# Patient Record
Sex: FEMALE | Race: WHITE | Employment: OTHER | URBAN - METROPOLITAN AREA
[De-identification: names, ages, dates, MRNs, and addresses within clinical notes are randomized per-mention and may not be internally consistent; named-entity substitution may affect disease eponyms.]

---

## 2019-09-01 ENCOUNTER — APPOINTMENT (OUTPATIENT)
Dept: CT IMAGING | Age: 64
End: 2019-09-01
Attending: EMERGENCY MEDICINE
Payer: COMMERCIAL

## 2019-09-01 ENCOUNTER — HOSPITAL ENCOUNTER (OUTPATIENT)
Age: 64
Setting detail: OBSERVATION
Discharge: HOME | End: 2019-09-04
Attending: EMERGENCY MEDICINE | Admitting: INTERNAL MEDICINE
Payer: COMMERCIAL

## 2019-09-01 ENCOUNTER — APPOINTMENT (OUTPATIENT)
Dept: GENERAL RADIOLOGY | Age: 64
End: 2019-09-01
Attending: EMERGENCY MEDICINE
Payer: COMMERCIAL

## 2019-09-01 DIAGNOSIS — R47.9 SPEECH DISTURBANCE, UNSPECIFIED TYPE: ICD-10-CM

## 2019-09-01 DIAGNOSIS — R20.2 NUMBNESS AND TINGLING OF BOTH LOWER EXTREMITIES: Primary | ICD-10-CM

## 2019-09-01 DIAGNOSIS — R00.2 PALPITATIONS: ICD-10-CM

## 2019-09-01 DIAGNOSIS — R20.0 NUMBNESS AND TINGLING OF BOTH LOWER EXTREMITIES: Primary | ICD-10-CM

## 2019-09-01 DIAGNOSIS — R53.1 WEAKNESS: ICD-10-CM

## 2019-09-01 PROBLEM — I63.9 ACUTE CVA (CEREBROVASCULAR ACCIDENT) (HCC): Status: ACTIVE | Noted: 2019-09-01

## 2019-09-01 PROBLEM — G45.9 TIA (TRANSIENT ISCHEMIC ATTACK): Status: ACTIVE | Noted: 2019-09-01

## 2019-09-01 LAB
ALBUMIN SERPL-MCNC: 3.6 G/DL (ref 3.5–5)
ALBUMIN/GLOB SERPL: 1 {RATIO} (ref 1.1–2.2)
ALP SERPL-CCNC: 87 U/L (ref 45–117)
ALT SERPL-CCNC: 28 U/L (ref 12–78)
ANION GAP SERPL CALC-SCNC: 12 MMOL/L (ref 5–15)
AST SERPL-CCNC: 18 U/L (ref 15–37)
ATRIAL RATE: 81 BPM
BASOPHILS # BLD: 0 K/UL (ref 0–0.1)
BASOPHILS NFR BLD: 0 % (ref 0–1)
BILIRUB SERPL-MCNC: 0.3 MG/DL (ref 0.2–1)
BNP SERPL-MCNC: 293 PG/ML (ref 0–125)
BUN SERPL-MCNC: 13 MG/DL (ref 6–20)
BUN/CREAT SERPL: 11 (ref 12–20)
CALCIUM SERPL-MCNC: 8.7 MG/DL (ref 8.5–10.1)
CALCULATED P AXIS, ECG09: 35 DEGREES
CALCULATED R AXIS, ECG10: 10 DEGREES
CALCULATED T AXIS, ECG11: 25 DEGREES
CHLORIDE SERPL-SCNC: 102 MMOL/L (ref 97–108)
CO2 SERPL-SCNC: 26 MMOL/L (ref 21–32)
CREAT SERPL-MCNC: 1.14 MG/DL (ref 0.55–1.02)
DIAGNOSIS, 93000: NORMAL
DIFFERENTIAL METHOD BLD: ABNORMAL
EOSINOPHIL # BLD: 0.3 K/UL (ref 0–0.4)
EOSINOPHIL NFR BLD: 3 % (ref 0–7)
ERYTHROCYTE [DISTWIDTH] IN BLOOD BY AUTOMATED COUNT: 14.1 % (ref 11.5–14.5)
GLOBULIN SER CALC-MCNC: 3.5 G/DL (ref 2–4)
GLUCOSE BLD STRIP.AUTO-MCNC: 110 MG/DL (ref 65–100)
GLUCOSE BLD STRIP.AUTO-MCNC: 124 MG/DL (ref 65–100)
GLUCOSE BLD STRIP.AUTO-MCNC: 95 MG/DL (ref 65–100)
GLUCOSE SERPL-MCNC: 117 MG/DL (ref 65–100)
HCT VFR BLD AUTO: 37.8 % (ref 35–47)
HGB BLD-MCNC: 12.3 G/DL (ref 11.5–16)
IMM GRANULOCYTES # BLD AUTO: 0 K/UL (ref 0–0.04)
IMM GRANULOCYTES NFR BLD AUTO: 1 % (ref 0–0.5)
INR PPP: 0.9 (ref 0.9–1.1)
LYMPHOCYTES # BLD: 1.8 K/UL (ref 0.8–3.5)
LYMPHOCYTES NFR BLD: 23 % (ref 12–49)
MAGNESIUM SERPL-MCNC: 1.8 MG/DL (ref 1.6–2.4)
MCH RBC QN AUTO: 28.3 PG (ref 26–34)
MCHC RBC AUTO-ENTMCNC: 32.5 G/DL (ref 30–36.5)
MCV RBC AUTO: 87.1 FL (ref 80–99)
MONOCYTES # BLD: 0.6 K/UL (ref 0–1)
MONOCYTES NFR BLD: 8 % (ref 5–13)
NEUTS SEG # BLD: 5.4 K/UL (ref 1.8–8)
NEUTS SEG NFR BLD: 65 % (ref 32–75)
NRBC # BLD: 0 K/UL (ref 0–0.01)
NRBC BLD-RTO: 0 PER 100 WBC
P-R INTERVAL, ECG05: 160 MS
PLATELET # BLD AUTO: 245 K/UL (ref 150–400)
PMV BLD AUTO: 8.7 FL (ref 8.9–12.9)
POTASSIUM SERPL-SCNC: 3.8 MMOL/L (ref 3.5–5.1)
PROT SERPL-MCNC: 7.1 G/DL (ref 6.4–8.2)
PROTHROMBIN TIME: 9.2 SEC (ref 9–11.1)
Q-T INTERVAL, ECG07: 404 MS
QRS DURATION, ECG06: 84 MS
QTC CALCULATION (BEZET), ECG08: 469 MS
RBC # BLD AUTO: 4.34 M/UL (ref 3.8–5.2)
SERVICE CMNT-IMP: ABNORMAL
SERVICE CMNT-IMP: ABNORMAL
SERVICE CMNT-IMP: NORMAL
SODIUM SERPL-SCNC: 140 MMOL/L (ref 136–145)
TROPONIN I SERPL-MCNC: <0.05 NG/ML
VENTRICULAR RATE, ECG03: 81 BPM
WBC # BLD AUTO: 8.1 K/UL (ref 3.6–11)

## 2019-09-01 PROCEDURE — 70496 CT ANGIOGRAPHY HEAD: CPT

## 2019-09-01 PROCEDURE — 70450 CT HEAD/BRAIN W/O DYE: CPT

## 2019-09-01 PROCEDURE — 99285 EMERGENCY DEPT VISIT HI MDM: CPT

## 2019-09-01 PROCEDURE — 84484 ASSAY OF TROPONIN QUANT: CPT

## 2019-09-01 PROCEDURE — 36415 COLL VENOUS BLD VENIPUNCTURE: CPT

## 2019-09-01 PROCEDURE — 71045 X-RAY EXAM CHEST 1 VIEW: CPT

## 2019-09-01 PROCEDURE — 99218 HC RM OBSERVATION: CPT

## 2019-09-01 PROCEDURE — 85610 PROTHROMBIN TIME: CPT

## 2019-09-01 PROCEDURE — 74011250636 HC RX REV CODE- 250/636: Performed by: INTERNAL MEDICINE

## 2019-09-01 PROCEDURE — 82962 GLUCOSE BLOOD TEST: CPT

## 2019-09-01 PROCEDURE — 83735 ASSAY OF MAGNESIUM: CPT

## 2019-09-01 PROCEDURE — 80053 COMPREHEN METABOLIC PANEL: CPT

## 2019-09-01 PROCEDURE — 74011250636 HC RX REV CODE- 250/636: Performed by: EMERGENCY MEDICINE

## 2019-09-01 PROCEDURE — 85025 COMPLETE CBC W/AUTO DIFF WBC: CPT

## 2019-09-01 PROCEDURE — 96372 THER/PROPH/DIAG INJ SC/IM: CPT

## 2019-09-01 PROCEDURE — 83880 ASSAY OF NATRIURETIC PEPTIDE: CPT

## 2019-09-01 PROCEDURE — 74011636320 HC RX REV CODE- 636/320: Performed by: EMERGENCY MEDICINE

## 2019-09-01 PROCEDURE — 74011250637 HC RX REV CODE- 250/637: Performed by: INTERNAL MEDICINE

## 2019-09-01 PROCEDURE — 93005 ELECTROCARDIOGRAM TRACING: CPT

## 2019-09-01 RX ORDER — SODIUM CHLORIDE 0.9 % (FLUSH) 0.9 %
5-40 SYRINGE (ML) INJECTION EVERY 8 HOURS
Status: DISCONTINUED | OUTPATIENT
Start: 2019-09-01 | End: 2019-09-04 | Stop reason: HOSPADM

## 2019-09-01 RX ORDER — HEPARIN SODIUM 5000 [USP'U]/ML
5000 INJECTION, SOLUTION INTRAVENOUS; SUBCUTANEOUS EVERY 8 HOURS
Status: DISCONTINUED | OUTPATIENT
Start: 2019-09-01 | End: 2019-09-04 | Stop reason: HOSPADM

## 2019-09-01 RX ORDER — SODIUM CHLORIDE 9 MG/ML
50 INJECTION, SOLUTION INTRAVENOUS
Status: COMPLETED | OUTPATIENT
Start: 2019-09-01 | End: 2019-09-01

## 2019-09-01 RX ORDER — ONDANSETRON 2 MG/ML
4 INJECTION INTRAMUSCULAR; INTRAVENOUS
Status: DISCONTINUED | OUTPATIENT
Start: 2019-09-01 | End: 2019-09-04 | Stop reason: HOSPADM

## 2019-09-01 RX ORDER — SODIUM CHLORIDE 0.9 % (FLUSH) 0.9 %
5-40 SYRINGE (ML) INJECTION AS NEEDED
Status: DISCONTINUED | OUTPATIENT
Start: 2019-09-01 | End: 2019-09-04 | Stop reason: HOSPADM

## 2019-09-01 RX ORDER — DIPHENHYDRAMINE HCL 25 MG
25 CAPSULE ORAL
Status: DISCONTINUED | OUTPATIENT
Start: 2019-09-01 | End: 2019-09-04 | Stop reason: HOSPADM

## 2019-09-01 RX ORDER — INSULIN LISPRO 100 [IU]/ML
INJECTION, SOLUTION INTRAVENOUS; SUBCUTANEOUS
Status: DISCONTINUED | OUTPATIENT
Start: 2019-09-01 | End: 2019-09-04 | Stop reason: HOSPADM

## 2019-09-01 RX ORDER — BISACODYL 5 MG
5 TABLET, DELAYED RELEASE (ENTERIC COATED) ORAL DAILY PRN
Status: DISCONTINUED | OUTPATIENT
Start: 2019-09-01 | End: 2019-09-04 | Stop reason: HOSPADM

## 2019-09-01 RX ORDER — ACETAMINOPHEN 650 MG/1
650 SUPPOSITORY RECTAL
Status: DISCONTINUED | OUTPATIENT
Start: 2019-09-01 | End: 2019-09-04 | Stop reason: HOSPADM

## 2019-09-01 RX ORDER — DEXTROSE MONOHYDRATE 100 MG/ML
125-250 INJECTION, SOLUTION INTRAVENOUS AS NEEDED
Status: DISCONTINUED | OUTPATIENT
Start: 2019-09-01 | End: 2019-09-04 | Stop reason: HOSPADM

## 2019-09-01 RX ORDER — ACETAMINOPHEN 325 MG/1
650 TABLET ORAL
Status: DISCONTINUED | OUTPATIENT
Start: 2019-09-01 | End: 2019-09-01

## 2019-09-01 RX ORDER — FLUOXETINE HYDROCHLORIDE 20 MG/1
20 CAPSULE ORAL DAILY
Status: DISCONTINUED | OUTPATIENT
Start: 2019-09-02 | End: 2019-09-04 | Stop reason: HOSPADM

## 2019-09-01 RX ORDER — ATORVASTATIN CALCIUM 20 MG/1
40 TABLET, FILM COATED ORAL
Status: DISCONTINUED | OUTPATIENT
Start: 2019-09-01 | End: 2019-09-04 | Stop reason: HOSPADM

## 2019-09-01 RX ORDER — FAMOTIDINE 20 MG/1
20 TABLET, FILM COATED ORAL EVERY 12 HOURS
Status: DISCONTINUED | OUTPATIENT
Start: 2019-09-01 | End: 2019-09-04 | Stop reason: HOSPADM

## 2019-09-01 RX ORDER — SODIUM CHLORIDE 0.9 % (FLUSH) 0.9 %
10 SYRINGE (ML) INJECTION
Status: COMPLETED | OUTPATIENT
Start: 2019-09-01 | End: 2019-09-01

## 2019-09-01 RX ORDER — ATENOLOL 50 MG/1
100 TABLET ORAL DAILY
Status: DISCONTINUED | OUTPATIENT
Start: 2019-09-02 | End: 2019-09-04 | Stop reason: HOSPADM

## 2019-09-01 RX ORDER — ACETAMINOPHEN 325 MG/1
650 TABLET ORAL
Status: DISCONTINUED | OUTPATIENT
Start: 2019-09-01 | End: 2019-09-04 | Stop reason: HOSPADM

## 2019-09-01 RX ORDER — MAGNESIUM SULFATE 100 %
4 CRYSTALS MISCELLANEOUS AS NEEDED
Status: DISCONTINUED | OUTPATIENT
Start: 2019-09-01 | End: 2019-09-04 | Stop reason: HOSPADM

## 2019-09-01 RX ORDER — HYDROCODONE BITARTRATE AND ACETAMINOPHEN 7.5; 325 MG/15ML; MG/15ML
10 SOLUTION ORAL
Status: DISCONTINUED | OUTPATIENT
Start: 2019-09-01 | End: 2019-09-01

## 2019-09-01 RX ADMIN — IOPAMIDOL 100 ML: 755 INJECTION, SOLUTION INTRAVENOUS at 15:25

## 2019-09-01 RX ADMIN — HEPARIN SODIUM 5000 UNITS: 5000 INJECTION INTRAVENOUS; SUBCUTANEOUS at 23:08

## 2019-09-01 RX ADMIN — Medication 10 ML: at 23:04

## 2019-09-01 RX ADMIN — FAMOTIDINE 20 MG: 20 TABLET ORAL at 23:10

## 2019-09-01 RX ADMIN — Medication 10 ML: at 15:25

## 2019-09-01 RX ADMIN — SODIUM CHLORIDE 50 ML/HR: 900 INJECTION, SOLUTION INTRAVENOUS at 15:25

## 2019-09-01 RX ADMIN — ATORVASTATIN CALCIUM 40 MG: 20 TABLET, FILM COATED ORAL at 23:04

## 2019-09-01 NOTE — ED PROVIDER NOTES
77-year-old morbidly obese white female past medical history breast cancer, diabetes, GERD, and hypertension, presents complaining of \"trying to walk down the [place] and suddenly got weak fell into the wall on the left side and my heart was beating funny\". Denies loss of consciousness, other associated symptoms, other than \"both my legs and both my arms felt like they were numb and weak\". Patient states this is the fifth episode of this type of occurrence of constellation of symptoms this month but the last one appearing previous this week on Wednesday or Thursday.     pt denies HA, vison changes, current numbness/ tingling,  diff swallowing, CP, SOB, Abd pain, F/Ch, N/V, D/Cons or other current systemic complaints           Past Medical History:   Diagnosis Date    Breast cancer ([facility] 1/11/2011    Cancer ([facility] [address]     BREAST,left    Diabetes ([facility] [address]     GERD (gastroesophageal reflux disease)     Hypertension        Past Surgical History:   Procedure Laterality Date    BREAST SURGERY PROCEDURE UNLISTED  1989    L BREAST LUMPECTOMY    BREAST SURGERY PROCEDURE UNLISTED  1990    BENIGN TUMOR L     HX GYN  2001    partial hysterectomy    HX HEENT  1965    TONSILLECTOMY    HX HEENT  2008    ARI CATARACTS    HX MASTECTOMY      bilateral 2011    HX ORTHOPAEDIC  2004    ARTHROSCOPY R KNEE    HX OTHER SURGICAL  1993    BENIGN TUMOR NECK    HX OTHER SURGICAL  2004    R UNDER ARM-TUMOR         Family History:   Problem Relation Age of Onset    Other Father        Social History     Socioeconomic History    Marital status: SINGLE     Spouse name: Not on file    Number of children: Not on file    Years of education: Not on file    Highest education level: Not on file   Occupational History    Not on file   Social Needs    Financial resource strain: Not on file    Food insecurity:     Worry: Not on file     Inability: Not on file    Transportation needs:     Medical: Not on file     Non-medical: Not on file Tobacco Use    Smoking status: Former Smoker   Substance and Sexual Activity    Alcohol use: Yes     Comment: OCCASIONAL    Drug use: Not on file    Sexual activity: Not on file   Lifestyle    Physical activity:     Days per week: Not on file     Minutes per session: Not on file    Stress: Not on file   Relationships    Social connections:     Talks on phone: Not on file     Gets together: Not on file     Attends [employer] service: Not on file     Active member of club or organization: Not on file     Attends meetings of clubs or organizations: Not on file     Relationship status: Not on file    Intimate partner violence:     Fear of current or ex partner: Not on file     Emotionally abused: Not on file     Physically abused: Not on file     Forced sexual activity: Not on file   Other Topics Concern    Not on file   Social History Narrative    Not on file         ALLERGIES: Ibuprofen; Maxzide [triamterene-hydrochlorothiazid]; and Vasotec [enalapril maleate]    Review of Systems   Constitutional: Negative for chills and fever. HENT: Negative for trouble swallowing and voice change. Eyes: Negative for visual disturbance. Respiratory: Negative for cough, chest tightness and stridor. Cardiovascular: Positive for palpitations. Negative for chest pain and leg swelling. Gastrointestinal: Negative for abdominal pain, diarrhea, nausea and vomiting. Genitourinary: Negative for dysuria. Musculoskeletal: Negative for back pain. Skin: Negative for rash. Neurological: Positive for speech difficulty, weakness and numbness. Negative for facial asymmetry. All other systems reviewed and are negative. There were no vitals filed for this visit. Physical Exam   Constitutional: She is oriented to person, place, and time. She appears well-developed and well-nourished.   m obese, NAD, AxOx4, speaking in complete sentences  gcs = 15   HENT:   Head: Normocephalic and atraumatic.    Mouth/Throat: Oropharynx is clear and moist.   Cn intact    No facial droop/ slurred speech/ tongue deviation   Eyes: Pupils are equal, round, and reactive to light. Conjunctivae and EOM are normal. Right eye exhibits no discharge. Left eye exhibits no discharge. No scleral icterus. Neck: Normal range of motion. Neck supple. No JVD present. No tracheal deviation present. Cardiovascular: Normal rate, regular rhythm, normal heart sounds and intact distal pulses. Exam reveals no gallop and no friction rub. No murmur heard. Pulmonary/Chest: Effort normal and breath sounds normal. No stridor. No respiratory distress. She has no wheezes. She has no rales. She exhibits no tenderness. Abdominal: Soft. Bowel sounds are normal. There is no tenderness. There is no rebound and no guarding. nttp     Genitourinary: No vaginal discharge found. Genitourinary Comments: Pt denies urinary/ vaginal complaints   Musculoskeletal: Normal range of motion. She exhibits no edema, tenderness or deformity. Neurological: She is alert and oriented to person, place, and time. No cranial nerve deficit. She exhibits normal muscle tone. Coordination normal.   pt has motor/ CV/ Sensation grossly intact to all extremities, R = L in strength;   Skin: Skin is warm and dry. Capillary refill takes less than 2 seconds. No rash noted. No erythema. No pallor. Psychiatric: She has a normal mood and affect. Her behavior is normal. Thought content normal.   Nursing note and vitals reviewed. MDM       Procedures    No chief complaint on file. 1:25 PM  The patients presenting problems have been discussed, and they are in agreement with the care plan formulated and outlined with them. I have encouraged them to ask questions as they arise throughout their visit.     MEDICATIONS GIVEN:  Medications - No data to display    LABS REVIEWED:  Labs Reviewed   SAMPLES BEING HELD       RADIOLOGY RESULTS:  The following have been ordered and reviewed:  _____________________________________________________________________  _____________________________________________________________________    EKG interpretation:   Rhythm: normal sinus rhythm; and regular . Rate (approx.): 80; Axis: normal; P wave: normal; QRS interval: normal ; ST/T wave: normal; Negative acute significant segmental elevations/ unchanged compared to study dated 01/05/2011    PROCEDURES:        CONSULTATIONS:       PROGRESS NOTES:      DIAGNOSIS:    1. Numbness and tingling of both lower extremities    2. Weakness    3. Speech disturbance, unspecified type    4. Palpitations        PLAN:  1- admit/ r/o TIA;       ED COURSE: The patients hospital course has been uncomplicated. CONSULT  NOTE  1:47 PM  Neva Oliveros MD spoke with Dr Hal Schwarz. Specialty: Teleneurology  Discussed pt's hx, disposition, and available diagnostic and imaging results. Reviewed care plans. Consulting physician agrees with plans as outlined 'I will see her'; [facility] CONSULT  NOTE  2:14 PM  Neva Oliveros MD spoke with Dr Hal Schwarz. Specialty: Teleneurology  Discussed pt's hx, disposition, and available diagnostic and imaging results. Reviewed care plans. Consulting physician agrees with plans as outlined 'No TPA/ CTA/ MRI/ Admit. Pt agrees w/ plans; Hospitalist Skyla for Admission  2:35 PM    ED Room Number: SER03/03  Patient Name and age: Verenice Haines 59 y.o.  female  Working Diagnosis:   1. Numbness and tingling of both lower extremities    2. Weakness    3. Speech disturbance, unspecified type    4.  Palpitations      Readmission: no  Isolation Requirements:  no  Recommended Level of Care:  telemetry  Code Status:  Full Code  Department:[place] [facility] - [phone]  Other:  No TPA

## 2019-09-01 NOTE — PROGRESS NOTES
Bedside and Verbal shift change report given to Brittani Briscoe RN (oncoming nurse) by Tammy Wadsworth RN and MALIK Carbone (offgoing nurse). Report included the following information SBAR, Kardex, MAR and Cardiac Rhythm NSR.

## 2019-09-01 NOTE — ED NOTES
Patient arriving with c/o slurred speech, dizziness and shortness of breath starting around 12:45 after Sabianism. Friend reports that she lost her balance and fell into a wall, reports that he speech continues to sound slurred to her. Code S Level 1 called, patient to CT.

## 2019-09-01 NOTE — ED NOTES
[facility] here to transfer pt to [facility]. Report given to Medic and RN from [facility]. Pt left on stretcher in stable condition.

## 2019-09-01 NOTE — PROGRESS NOTES
Problem: Pain  Goal: *Control of Pain  Outcome: Progressing Towards Goal     Problem: Pressure Injury - Risk of  Goal: *Prevention of pressure injury  Description  Document Nadir Scale and appropriate interventions in the flowsheet. Outcome: Progressing Towards Goal  Note:   Pressure Injury Interventions: Activity Interventions: Assess need for specialty bed, Increase time out of bed, PT/OT evaluation, Pressure redistribution bed/mattress(bed type)         Nutrition Interventions: Document food/fluid/supplement intake                     Problem: Falls - Risk of  Goal: *Absence of Falls  Description  Document Brandenin Filimauro Fall Risk and appropriate interventions in the flowsheet.   Outcome: Progressing Towards Goal  Note:   Fall Risk Interventions:  Mobility Interventions: Bed/chair exit alarm, Patient to call before getting OOB, PT Consult for mobility concerns, Utilize walker, cane, or other assistive device                   History of Falls Interventions: Bed/chair exit alarm, Door open when patient unattended, Evaluate medications/consider consulting pharmacy, Investigate reason for fall         Problem: TIA/CVA Stroke: 0-24 hours  Goal: Activity/Safety  Outcome: Progressing Towards Goal  Goal: Nutrition/Diet  Outcome: Progressing Towards Goal

## 2019-09-01 NOTE — ED NOTES
Report called to [facility] on [facility]. Pt has returned from CTA.   Will continue to monitor

## 2019-09-01 NOTE — ED TRIAGE NOTES
Pt comes in today presents with slurred speech and \"leaning to one side\". Pt was taken straight to CT with BS completed on CT Table on 124, MD assessed pt outside of CT while table was being cleared. Pt states last time felt well was 1145 last time she felt \"normal\". Pt was scanned, code S called, pt weighed on scale, IV established, VSS, pt. Pt A7O x4, denies pain, denied numbness until after laying down on CT table now c/o numbness around lips, all the way around, not one side more than the other.

## 2019-09-02 LAB
ALBUMIN SERPL-MCNC: 3.4 G/DL (ref 3.5–5)
ALBUMIN/GLOB SERPL: 1 {RATIO} (ref 1.1–2.2)
ALP SERPL-CCNC: 87 U/L (ref 45–117)
ALT SERPL-CCNC: 22 U/L (ref 12–78)
ANION GAP SERPL CALC-SCNC: 6 MMOL/L (ref 5–15)
APPEARANCE UR: CLEAR
AST SERPL-CCNC: 28 U/L (ref 15–37)
BACTERIA URNS QL MICRO: NEGATIVE /HPF
BASOPHILS # BLD: 0 K/UL (ref 0–0.1)
BASOPHILS NFR BLD: 0 % (ref 0–1)
BILIRUB SERPL-MCNC: 0.5 MG/DL (ref 0.2–1)
BILIRUB UR QL: NEGATIVE
BUN SERPL-MCNC: 10 MG/DL (ref 6–20)
BUN/CREAT SERPL: 9 (ref 12–20)
CALCIUM SERPL-MCNC: 9.2 MG/DL (ref 8.5–10.1)
CHLORIDE SERPL-SCNC: 106 MMOL/L (ref 97–108)
CHOLEST SERPL-MCNC: 145 MG/DL
CO2 SERPL-SCNC: 26 MMOL/L (ref 21–32)
COLOR UR: ABNORMAL
CREAT SERPL-MCNC: 1.06 MG/DL (ref 0.55–1.02)
CRP SERPL-MCNC: 0.36 MG/DL (ref 0–0.6)
D DIMER PPP FEU-MCNC: 0.41 MG/L FEU (ref 0–0.65)
DIFFERENTIAL METHOD BLD: ABNORMAL
EOSINOPHIL # BLD: 0.2 K/UL (ref 0–0.4)
EOSINOPHIL NFR BLD: 3 % (ref 0–7)
EPITH CASTS URNS QL MICRO: ABNORMAL /LPF
ERYTHROCYTE [DISTWIDTH] IN BLOOD BY AUTOMATED COUNT: 14 % (ref 11.5–14.5)
ERYTHROCYTE [SEDIMENTATION RATE] IN BLOOD: 37 MM/HR (ref 0–30)
EST. AVERAGE GLUCOSE BLD GHB EST-MCNC: 128 MG/DL
GLOBULIN SER CALC-MCNC: 3.5 G/DL (ref 2–4)
GLUCOSE BLD STRIP.AUTO-MCNC: 119 MG/DL (ref 65–100)
GLUCOSE BLD STRIP.AUTO-MCNC: 158 MG/DL (ref 65–100)
GLUCOSE BLD STRIP.AUTO-MCNC: 93 MG/DL (ref 65–100)
GLUCOSE BLD STRIP.AUTO-MCNC: 99 MG/DL (ref 65–100)
GLUCOSE SERPL-MCNC: 98 MG/DL (ref 65–100)
GLUCOSE UR STRIP.AUTO-MCNC: NEGATIVE MG/DL
HBA1C MFR BLD: 6.1 % (ref 4.2–6.3)
HCT VFR BLD AUTO: 37.9 % (ref 35–47)
HDLC SERPL-MCNC: 41 MG/DL
HDLC SERPL: 3.5 {RATIO} (ref 0–5)
HGB BLD-MCNC: 12 G/DL (ref 11.5–16)
HGB UR QL STRIP: NEGATIVE
HYALINE CASTS URNS QL MICRO: ABNORMAL /LPF (ref 0–5)
IMM GRANULOCYTES # BLD AUTO: 0 K/UL (ref 0–0.04)
IMM GRANULOCYTES NFR BLD AUTO: 0 % (ref 0–0.5)
KETONES UR QL STRIP.AUTO: NEGATIVE MG/DL
LDLC SERPL CALC-MCNC: 49 MG/DL (ref 0–100)
LEUKOCYTE ESTERASE UR QL STRIP.AUTO: ABNORMAL
LIPID PROFILE,FLP: ABNORMAL
LYMPHOCYTES # BLD: 1.6 K/UL (ref 0.8–3.5)
LYMPHOCYTES NFR BLD: 22 % (ref 12–49)
MAGNESIUM SERPL-MCNC: 2.1 MG/DL (ref 1.6–2.4)
MCH RBC QN AUTO: 28.6 PG (ref 26–34)
MCHC RBC AUTO-ENTMCNC: 31.7 G/DL (ref 30–36.5)
MCV RBC AUTO: 90.5 FL (ref 80–99)
MONOCYTES # BLD: 0.6 K/UL (ref 0–1)
MONOCYTES NFR BLD: 8 % (ref 5–13)
NEUTS SEG # BLD: 4.7 K/UL (ref 1.8–8)
NEUTS SEG NFR BLD: 67 % (ref 32–75)
NITRITE UR QL STRIP.AUTO: NEGATIVE
NRBC # BLD: 0 K/UL (ref 0–0.01)
NRBC BLD-RTO: 0 PER 100 WBC
PH UR STRIP: 5.5 [PH] (ref 5–8)
PHOSPHATE SERPL-MCNC: 4.9 MG/DL (ref 2.6–4.7)
PLATELET # BLD AUTO: 217 K/UL (ref 150–400)
PMV BLD AUTO: 8.7 FL (ref 8.9–12.9)
POTASSIUM SERPL-SCNC: 4.5 MMOL/L (ref 3.5–5.1)
PROT SERPL-MCNC: 6.9 G/DL (ref 6.4–8.2)
PROT UR STRIP-MCNC: NEGATIVE MG/DL
RBC # BLD AUTO: 4.19 M/UL (ref 3.8–5.2)
RBC #/AREA URNS HPF: ABNORMAL /HPF (ref 0–5)
SERVICE CMNT-IMP: ABNORMAL
SERVICE CMNT-IMP: ABNORMAL
SERVICE CMNT-IMP: NORMAL
SERVICE CMNT-IMP: NORMAL
SODIUM SERPL-SCNC: 138 MMOL/L (ref 136–145)
SP GR UR REFRACTOMETRY: 1.02 (ref 1–1.03)
TRIGL SERPL-MCNC: 275 MG/DL (ref ?–150)
TROPONIN I SERPL-MCNC: <0.05 NG/ML
TROPONIN I SERPL-MCNC: <0.05 NG/ML
TSH SERPL DL<=0.05 MIU/L-ACNC: 1.35 UIU/ML (ref 0.36–3.74)
UA: UC IF INDICATED,UAUC: ABNORMAL
UROBILINOGEN UR QL STRIP.AUTO: 0.2 EU/DL (ref 0.2–1)
VIT B12 SERPL-MCNC: 690 PG/ML (ref 193–986)
VLDLC SERPL CALC-MCNC: 55 MG/DL
WBC # BLD AUTO: 7.2 K/UL (ref 3.6–11)
WBC URNS QL MICRO: ABNORMAL /HPF (ref 0–4)

## 2019-09-02 PROCEDURE — 85025 COMPLETE CBC W/AUTO DIFF WBC: CPT

## 2019-09-02 PROCEDURE — 86140 C-REACTIVE PROTEIN: CPT

## 2019-09-02 PROCEDURE — 74011250636 HC RX REV CODE- 250/636: Performed by: INTERNAL MEDICINE

## 2019-09-02 PROCEDURE — 74011250637 HC RX REV CODE- 250/637: Performed by: INTERNAL MEDICINE

## 2019-09-02 PROCEDURE — 81001 URINALYSIS AUTO W/SCOPE: CPT

## 2019-09-02 PROCEDURE — 84100 ASSAY OF PHOSPHORUS: CPT

## 2019-09-02 PROCEDURE — 80053 COMPREHEN METABOLIC PANEL: CPT

## 2019-09-02 PROCEDURE — 36415 COLL VENOUS BLD VENIPUNCTURE: CPT

## 2019-09-02 PROCEDURE — 97161 PT EVAL LOW COMPLEX 20 MIN: CPT

## 2019-09-02 PROCEDURE — 83735 ASSAY OF MAGNESIUM: CPT

## 2019-09-02 PROCEDURE — 99218 HC RM OBSERVATION: CPT

## 2019-09-02 PROCEDURE — 85652 RBC SED RATE AUTOMATED: CPT

## 2019-09-02 PROCEDURE — 74011636637 HC RX REV CODE- 636/637: Performed by: INTERNAL MEDICINE

## 2019-09-02 PROCEDURE — 96372 THER/PROPH/DIAG INJ SC/IM: CPT

## 2019-09-02 PROCEDURE — 83036 HEMOGLOBIN GLYCOSYLATED A1C: CPT

## 2019-09-02 PROCEDURE — 82607 VITAMIN B-12: CPT

## 2019-09-02 PROCEDURE — 85379 FIBRIN DEGRADATION QUANT: CPT

## 2019-09-02 PROCEDURE — 97535 SELF CARE MNGMENT TRAINING: CPT

## 2019-09-02 PROCEDURE — 82962 GLUCOSE BLOOD TEST: CPT

## 2019-09-02 PROCEDURE — 80061 LIPID PANEL: CPT

## 2019-09-02 PROCEDURE — 97165 OT EVAL LOW COMPLEX 30 MIN: CPT

## 2019-09-02 PROCEDURE — 84443 ASSAY THYROID STIM HORMONE: CPT

## 2019-09-02 PROCEDURE — 84484 ASSAY OF TROPONIN QUANT: CPT

## 2019-09-02 PROCEDURE — 87086 URINE CULTURE/COLONY COUNT: CPT

## 2019-09-02 RX ORDER — LOPERAMIDE HYDROCHLORIDE 2 MG/1
2 CAPSULE ORAL
Status: DISCONTINUED | OUTPATIENT
Start: 2019-09-02 | End: 2019-09-04 | Stop reason: HOSPADM

## 2019-09-02 RX ORDER — GUAIFENESIN 100 MG/5ML
81 LIQUID (ML) ORAL DAILY
Status: DISCONTINUED | OUTPATIENT
Start: 2019-09-03 | End: 2019-09-04 | Stop reason: HOSPADM

## 2019-09-02 RX ADMIN — Medication 10 ML: at 15:06

## 2019-09-02 RX ADMIN — FLUOXETINE 20 MG: 20 CAPSULE ORAL at 08:13

## 2019-09-02 RX ADMIN — FAMOTIDINE 20 MG: 20 TABLET ORAL at 21:43

## 2019-09-02 RX ADMIN — HEPARIN SODIUM 5000 UNITS: 5000 INJECTION INTRAVENOUS; SUBCUTANEOUS at 06:56

## 2019-09-02 RX ADMIN — LOPERAMIDE HYDROCHLORIDE 2 MG: 2 CAPSULE ORAL at 15:04

## 2019-09-02 RX ADMIN — INSULIN LISPRO 2 UNITS: 100 INJECTION, SOLUTION INTRAVENOUS; SUBCUTANEOUS at 12:39

## 2019-09-02 RX ADMIN — Medication 10 ML: at 06:57

## 2019-09-02 RX ADMIN — Medication 10 ML: at 21:43

## 2019-09-02 RX ADMIN — Medication 10 ML: at 15:05

## 2019-09-02 RX ADMIN — ATENOLOL 100 MG: 50 TABLET ORAL at 08:13

## 2019-09-02 RX ADMIN — ATORVASTATIN CALCIUM 40 MG: 20 TABLET, FILM COATED ORAL at 21:43

## 2019-09-02 RX ADMIN — FAMOTIDINE 20 MG: 20 TABLET ORAL at 08:13

## 2019-09-02 RX ADMIN — HEPARIN SODIUM 5000 UNITS: 5000 INJECTION INTRAVENOUS; SUBCUTANEOUS at 22:41

## 2019-09-02 RX ADMIN — HEPARIN SODIUM 5000 UNITS: 5000 INJECTION INTRAVENOUS; SUBCUTANEOUS at 15:05

## 2019-09-02 NOTE — PROGRESS NOTES
PHYSICAL THERAPY EVALUATION/DISCHARGE  Patient: Lynn French (22 y.o. female)  Date: 9/2/2019  Primary Diagnosis: TIA (transient ischemic attack) [G45.9]       Precautions: fall         ASSESSMENT  Based on the objective data described below, the patient presents with r/o CVA with dizziness and unsteadiness. Pt lives alone but will likely DC home with family member for a few days. Pt ambulating well without loss of balance and baseline mobility per pt. Pt walks 250ft with cane and requires stand by assist for safety. Recommend pt DC home with supervision from family members and assist to walk in the hallway here from RN staff. Functional Outcome Measure: The patient scored 43/56 on the kirk outcome measure which is indicative of low fall risk. Other factors to consider for discharge:      Further skilled acute physical therapy is not indicated at this time. PLAN :  Recommendation for discharge: (in order for the patient to meet his/her long term goals)  No skilled physical therapy/ follow up rehabilitation needs identified at this time. This discharge recommendation:  Has not yet been discussed the attending provider and/or case management    Equipment recommendations for successful discharge: straight cane       SUBJECTIVE:   Patient stated Im thuan.     OBJECTIVE DATA SUMMARY:   HISTORY:    Past Medical History:   Diagnosis Date    Breast cancer ([facility] [address] 1/11/2011    Cancer ([address]     BREAST,left    Diabetes ([address]     GERD (gastroesophageal reflux disease)     Hypertension      Past Surgical History:   Procedure Laterality Date    BREAST SURGERY PROCEDURE UNLISTED  1989    L BREAST LUMPECTOMY    BREAST SURGERY PROCEDURE UNLISTED  1990    BENIGN TUMOR L     HX GYN  2001    partial hysterectomy    HX HEENT  1965    TONSILLECTOMY    HX HEENT  2008    ARI CATARACTS    HX MASTECTOMY      bilateral 2011    HX ORTHOPAEDIC  2004    ARTHROSCOPY R KNEE    HX OTHER SURGICAL  1993    BENIGN TUMOR NECK    HX OTHER SURGICAL  2004    R UNDER ARM-TUMOR       Prior level of function: mod I  Personal factors and/or comorbidities impacting plan of care:     Home Situation  Home Environment: Private residence  One/Two Story Residence: One story  Living Alone: Yes  Support Systems: Family member(s), Mandaen / saad community  Patient Expects to be Discharged to[de-identified] Private residence  Current DME Used/Available at Home: sarita Garcia    EXAMINATION/PRESENTATION/DECISION MAKING:   Critical Behavior:  Neurologic State: Alert, Appropriate for age  Orientation Level: Oriented X4  Cognition: Appropriate decision making, Appropriate for age attention/concentration, Appropriate safety awareness, Follows commands     Hearing: Auditory  Auditory Impairment: None  Skin:  intact  Edema: none  Range Of Motion:  AROM: Within functional limits           PROM: Within functional limits           Strength:    Strength: Generally decreased, functional                    Tone & Sensation:   Tone: Normal              Sensation: Intact               Coordination:  Coordination: Within functional limits  Vision:      Functional Mobility:  Bed Mobility:  Rolling: Independent  Supine to Sit: Independent  Sit to Supine: Independent  Scooting: Independent  Transfers:  Sit to Stand: Modified independent  Stand to Sit: Modified independent  Stand Pivot Transfers: Modified independent                    Balance:   Sitting: Intact  Standing: Impaired; Without support  Standing - Static: Good;Constant support  Standing - Dynamic : Good;Constant support  Ambulation/Gait Training:  Distance (ft): 250 Feet (ft)  Assistive Device: Cane, straight  Ambulation - Level of Assistance: Modified independent     Gait Description (WDL): Exceptions to WDL  Gait Abnormalities: Altered arm swing;Shuffling gait; Antalgic(r knee mildly painful)        Base of Support: Widened     Speed/Krystyna: Shuffled; Slow  Step Length: Left shortened;Right shortened Functional Measure:  Tai Flatten Balance Test:                                               /56         56=Maximum possible score;   0-20=High fall risk  21-40=Moderate fall risk   41-56=Low fall risk                  Physical Therapy Evaluation Charge Determination   History Examination Presentation Decision-Making   MEDIUM  Complexity : 1-2 comorbidities / personal factors will impact the outcome/ POC  MEDIUM Complexity : 3 Standardized tests and measures addressing body structure, function, activity limitation and / or participation in recreation  LOW Complexity : Stable, uncomplicated  Other outcome measures kirk  LOW       Based on the above components, the patient evaluation is determined to be of the following complexity level: LOW     Pain Rating:  No pain    Activity Tolerance: WNL  Please refer to the flowsheet for vital signs taken during this treatment. After treatment patient left in no apparent distress:   Supine in bed, Bed / chair alarm activated and Caregiver / family present    COMMUNICATION/EDUCATION:   The patients plan of care was discussed with: Registered Nurse. Fall prevention education was provided and the patient/caregiver indicated understanding. and Patient/family have participated as able in goal setting and plan of care.     Thank you for this referral.  Desi Lion, PT   Time Calculation: 20 mins

## 2019-09-02 NOTE — PROGRESS NOTES
Problem: Falls - Risk of  Goal: *Absence of Falls  Description  Document Britney Late Fall Risk and appropriate interventions in the flowsheet.   Outcome: Progressing Towards Goal  Note:   Fall Risk Interventions:  Mobility Interventions: Assess mobility with egress test, Bed/chair exit alarm, Communicate number of staff needed for ambulation/transfer, OT consult for ADLs, Patient to call before getting OOB, PT Consult for mobility concerns, PT Consult for assist device competence, Strengthening exercises (ROM-active/passive)                   History of Falls Interventions: Bed/chair exit alarm, Consult care management for discharge planning, Evaluate medications/consider consulting pharmacy, Investigate reason for fall, Room close to nurse's station, Utilize gait belt for transfer/ambulation, Assess for delayed presentation/identification of injury for 48 hrs (comment for end date), Vital signs minimum Q4HRs X 24 hrs (comment for end date), Door open when patient unattended

## 2019-09-02 NOTE — PROGRESS NOTES
Reason for Admission:   Possible CVA Presented tot he ED with slurred speech, dizziness                   RRAT Score:    6 / low               Plan for utilizing home health: TBD                         Current Advanced Directive/Advance Care Plan: Not on file. Patient's sisters Stefanie Mcardle [phone] and Maeola Sandhoff [phone] are Emergency contacts                         Transition of Care Plan:  Home    Care manager met with patient to introduce self and explain role. Patient lives independently in her own home alone. She uses a cane occasionally and has no previous home health needs. She confirmed her PCP to be Dr Dorcas Snellen and sees him every 6 months and has an appointment for Wed. Sept. 4th already scheduled. She uses [facility]. Confirmed patient's insurance to be [employer] [place]. Patient is observation, obs letter signed and copy given to patient and original placed on bedside chart. At discharge should patient need someone to stay with her she states she has a large support system and will have assistance should she need it. Ketty Johnson RN,CRM    Care Management Interventions  PCP Verified by CM: Yes(Dr Clark)  Palliative Care Criteria Met (RRAT>21 & CHF Dx)?: No  MyChart Signup: No  Discharge Durable Medical Equipment: No  Physical Therapy Consult: Yes  Occupational Therapy Consult: Yes  Speech Therapy Consult: Yes  Current Support Network: Lives Alone(Sisters :  Stefanie Mcardle [phone], Maeola Sandhoff [phone])  Confirm Follow Up Transport: Family

## 2019-09-02 NOTE — PROGRESS NOTES
OCCUPATIONAL THERAPY EVALUATION/DISCHARGE  Patient: Maddi Strickland (85 y.o. female)  Date: 9/2/2019  Primary Diagnosis: TIA (transient ischemic attack) [G45.9]       Precautions: fall       ASSESSMENT  Based on the objective data described below, the patient presents at functional baseline with acute deficits resolved. Orthostatics stable. Patient reports history of intermittent dizziness with falls/ near falls and reports she usually lands in a chair. Patient receptive to education on fall prevention and [facility] education on signs/ symptoms of CVA. Vitals:    09/02/19 1525 09/02/19 1526 09/02/19 1531   BP: 148/85 156/75 153/73   BP 1 Location: Left arm Left arm Left arm   BP Patient Position: Supine During activity; Sitting During activity;Standing   Pulse: 76 78 86   . Current Level of Function (ADLs/self-care): Modified independent     Functional Outcome Measure: The patient scored 66/66 on the [facility] with each UE which is indicative of 0% motor impairment     PLAN :  Recommendation for discharge: (in order for the patient to meet his/her long term goals)  No skilled occupational therapy/ follow up rehabilitation needs identified at this time.     This discharge recommendation:  Has not yet been discussed the attending provider and/or case management       SUBJECTIVE:   Patient stated I'm back to normal.    OBJECTIVE DATA SUMMARY:   HISTORY:   Past Medical History:   Diagnosis Date    Breast cancer ([facility] 1/11/2011    Cancer ([facility]     BREAST,left    Diabetes ([facility]     GERD (gastroesophageal reflux disease)     Hypertension      Past Surgical History:   Procedure Laterality Date    BREAST SURGERY PROCEDURE UNLISTED  1989    L BREAST LUMPECTOMY    BREAST SURGERY PROCEDURE UNLISTED  1990    BENIGN TUMOR L     HX GYN  2001    partial hysterectomy    HX HEENT  1965    TONSILLECTOMY    HX HEENT  2008    ARI CATARACTS    HX MASTECTOMY      bilateral 2011    HX ORTHOPAEDIC  2004 ARTHROSCOPY R KNEE    HX OTHER SURGICAL  1993    BENIGN TUMOR NECK    HX OTHER SURGICAL  2004    R UNDER ARM-TUMOR       Prior Level of Function/Environment/Context: modified independent ADLs and IADLs, with [facility]  Expanded or extensive additional review of patient history:     Home Situation  Home Environment: Private residence  One/Two Story Residence: One story  Living Alone: Yes  Support Systems: Family member(s), Religion / saad community  Patient Expects to be Discharged to[de-identified] Private residence  Current DME Used/Available at Home: Cane, straight    EXAMINATION OF PERFORMANCE DEFICITS:  Cognitive/Behavioral Status:  Neurologic State: Alert  Orientation Level: Oriented X4  Cognition: Appropriate decision making; Appropriate for age attention/concentration; Appropriate safety awareness; Follows commands  Perception: Appears intact  Perseveration: No perseveration noted  Safety/Judgement: Awareness of environment; Insight into deficits    Skin: visible skin appears intact    Edema: none noted    Hearing: Auditory  Auditory Impairment: None    Vision/Perceptual:    Tracking: Able to track stimulus in all quadrants w/o difficulty      reports floaters at baseline       Visual Fields: (able to detect stimuli in all fields)  Diplopia: No    Acuity: Within Defined Limits;Able to read clock/calendar on wall without difficulty; Able to read employee name badge without difficulty    Corrective Lenses: Reading glasses    Range of Motion:    AROM: Within functional limits(BUE)  PROM: Within functional limits                      Strength:    Strength: Within functional limits                Coordination:  Coordination: Within functional limits  Fine Motor Skills-Upper: Left Intact; Right Intact    Gross Motor Skills-Upper: Left Intact; Right Intact    Tone & Sensation:    Tone: Normal  Sensation: Intact                      Balance:  Sitting: Intact  Standing: Impaired  Standing - Static: Good  Standing - Dynamic : Good    Functional Mobility and Transfers for ADLs:  Bed Mobility:  Rolling: Independent  Supine to Sit: Independent  Sit to Supine: Independent  Scooting: Independent    Transfers:  Sit to Stand: Modified independent  Stand to Sit: Modified independent    ADL Assessment:  Feeding: Independent    Oral Facial Hygiene/Grooming: Modified Independent    Bathing: Modified independent    Upper Body Dressing: Modified independent    Lower Body Dressing: Modified independent    Toileting: Modified independent                ADL Intervention and task modifications:       Cognitive Retraining  Safety/Judgement: Awareness of environment; Insight into deficits      Functional Measure:  Fugl-Eng Assessment of Motor Recovery after Stroke:   Reflex Activity  Flexors/Biceps/Fingers: Can be elicited  Extensors/Triceps: Can be elicited  Reflex Subtotal: 4    Volitional Movement Within Synergies  Shoulder Retraction: Full  Shoulder Elevation: Full  Shoulder Abduction (90 degrees): Full  Shoulder External Rotation: Full  Elbow Flexion: Full  Forearm Supination: Full  Shoulder Adduction/Internal Rotation: Full  Elbow Extension: Full  Forearm Pronation: Full  Subtotal: 18    Volitional Movement Mixing Synergies  Hand to Lumbar Spine: Full  Shoulder Flexion (0-90 degrees): Full  Pronation-Supination: Full  Subtotal: 6    Volitional Movement With Little or No Synergy  Shoulder Abduction (0-90 degrees): Full  Shoulder Flexion ( degrees): Full  Pronation/Supination: Full  Subtotal : 6    Normal Reflex Activity  Biceps, Triceps, Finger Flexors:  Full  Subtotal : 2    Upper Extremity Total   Upper Extremity Total: 36    Wrist  Stability at 15 Degree Dorsiflexion: Full  Repeated Dorsiflexion/ Volar Flexion: Full  Stability at 15 Degree Dorsiflexion: Full  Repeated Dorsiflexion/ Volar Flexion: Full  Circumduction: Full  Wrist Total: 10    Hand  Mass Flexion: Full  Mass Extension: Full  Grasp A: Full  Grasp B: Full  Grasp C: Full  Grasp D: Full  Grasp E: Full  Hand Total: 14    Coordination/Speed  Tremor: None  Dysmetria: None  Time: <1s  Coordination/Speed Total : 6    Total A-D  Total A-D (Motor Function): 66/66     This is a reliable/valid measure of arm function after a neurological event. It has established value to characterize functional status and for measuring spontaneous and therapy-induced recovery; tests proximal and distal motor functions. Fugl-Eng Assessment  UE scores recorded between five and 30 days post neurologic event can be used to predict UE recovery at six months post neurologic event. Severe = 0-21 points   Moderately Severe = 22-33 points   Moderate = 34-47 points   Mild = 48-66 points  DENNYS Arroyo, PASCUAL Hernandez, & LILLIANA Sparrow (1992). Measurement of motor recovery after stroke: Outcome assessment and sample size requirements.  Stroke, 23, pp. 2887-8488.   ------------------------------------------------------------------------------------------------------------------------------------------------------------------  MCID:  Stroke:   Vasiliy Salgado et al, 2001; n = 171; mean age 79 (6) years; assessed within 16 (12) days of stroke, Acute Stroke)  FMA Motor Scores from Admission to Discharge   10 point increase in FMA Upper Extremity = 1.5 change in discharge FIM   10 point increase in FMA Lower Extremity = 1.9 change in discharge FIM  MDC:   Stroke:   Fay Curry et al, 2008, n = 14, mean age = 59.9 (14.6) years, assessed on average 14 (6.5) months post stroke, Chronic Stroke)   FMA = 5.2 points for the Upper Extremity portion of the assessment     Occupational Therapy Evaluation Charge Determination   History Examination Decision-Making   LOW Complexity : Brief history review  LOW Complexity : 1-3 performance deficits relating to physical, cognitive , or psychosocial skils that result in activity limitations and / or participation restrictions  LOW Complexity : No comorbidities that affect functional and no verbal or physical assistance needed to complete eval tasks       Based on the above components, the patient evaluation is determined to be of the following complexity level: LOW   Pain Rating:  Patient reports no pain    Activity Tolerance:   VSS    After treatment patient left in no apparent distress:    Supine in bed, Call bell within reach and Caregiver / family present    COMMUNICATION/EDUCATION:   The patients plan of care was discussed with: Physical Therapist and Registered Nurse. Patient and/or family was verbally educated on the BE FAST acronym for signs/symptoms of CVA and TIA. BE FAST was written on patient's communication board  for visual education and reinforcement. All questions answered with patient indicating good understanding.      Thank you for this referral.  Karen Milligan, OT  Time Calculation: 18 mins

## 2019-09-02 NOTE — PROGRESS NOTES
NUTRITION       Automatic MD consult received for weight loss education based on BMI only. Recommend referral to outpatient dietitian for weight loss education in a more appropriate setting since not an acute issue. Will continue to rescreen per protocol.      Briana Go RD

## 2019-09-02 NOTE — PROGRESS NOTES
Hospitalist Progress Note  Alisha White MD  Answering service: [phone] from in house phone        Date of Service:  2019  NAME:  Carol Santamaria  :  1955  MRN:  [id number]      Admission Summary: This is a 63-year-old woman with past medical history significant for hypertension, dyslipidemia, and type 2 diabetes was in her usual state of health until the day of her presentation at the emergency room when the patient developed slurred speech and dizziness. The patient's symptoms started at about 12:45 at a [employer]. Interval history / Subjective:    Had not yet tried to get up, but her dizziness seems to be a bit better today. Assessment & Plan:     Dizziness - rule out CVA  - MRI pending  - Echo pending  - Neuro consult pending  - Suspect may be more related to vertigo, but patient also has some h/o ataxia which is unclear if related to dizziness  - Add ASA, continue home statin  - A1c 6.1, LDL49  - PT/OT    HLD - home statin  HTN - home meds, continue to monitor   Type 2 diabtes - A1c 6.1% BS   - SSI, POCT glucose checks and hypoglycemia protocol    Code status: FULL  DVT prophylaxis: SCDs    Care Plan discussed with: Patient/Family  Disposition: TBD, hopefully tomorrow after MRI     Hospital Problems  Date Reviewed: 2019          Codes Class Noted POA    * (Principal) Acute CVA (cerebrovascular accident) ([facility] ICD-10-CM: I63.9  ICD-9-CM: 434.91  2019 Yes                Review of Systems:   A comprehensive review of systems was negative except for that written in the HPI. Vital Signs:    Last 24hrs VS reviewed since prior progress note.  Most recent are:  Visit Vitals  /67 (BP 1 Location: Left arm, BP Patient Position: At rest)   Pulse 76   Temp 98.4 °F (36.9 °C)   Resp 14   Ht 5' (1.524 m)   Wt 97.4 kg (214 lb 11.7 oz)   SpO2 98%   BMI 41.94 kg/m²       No intake or output data in the 24 hours ending 09/02/19 1414     Physical Examination:             Constitutional:  No acute distress, cooperative, pleasant    ENT:  Oral mucous moist, oropharynx benign. Neck supple,    Resp:  CTA bilaterally. No wheezing/rhonchi/rales. No accessory muscle use   CV:  Regular rhythm, normal rate, no murmurs, gallops, rubs    GI:  Soft, non distended, non tender. normoactive bowel sounds, no hepatosplenomegaly     Musculoskeletal:  No edema, warm, 2+ pulses throughout    Neurologic:  Moves all extremities. AAOx3, CN II-XII reviewed     Skin:  Good turgor, no rashes or ulcers       Data Review:    Review and/or order of clinical lab test  Review and/or order of tests in the radiology section of CPT  Review and/or order of tests in the medicine section of CPT      Labs:     Recent Labs     09/02/19  0407 09/01/19  1345   WBC 7.2 8.1   HGB 12.0 12.3   HCT 37.9 37.8    245     Recent Labs     09/02/19  0407 09/01/19  1345    140   K 4.5 3.8    102   CO2 26 26   BUN 10 13   CREA 1.06* 1.14*   GLU 98 117*   CA 9.2 8.7   MG 2.1 1.8   PHOS 4.9*  --      Recent Labs     09/02/19  0407 09/01/19  1345   SGOT 28 18   ALT 22 28   AP 87 87   TBILI 0.5 0.3   TP 6.9 7.1   ALB 3.4* 3.6   GLOB 3.5 3.5     Recent Labs     09/01/19  1345   INR 0.9   PTP 9.2      No results for input(s): FE, TIBC, PSAT, FERR in the last 72 hours. No results found for: FOL, RBCF   No results for input(s): PH, PCO2, PO2 in the last 72 hours.   Recent Labs     09/02/19  0407 09/01/19  2325 09/01/19  1345   TROIQ <0.05 <0.05 <0.05     Lab Results   Component Value Date/Time    Cholesterol, total 145 09/02/2019 04:07 AM    HDL Cholesterol 41 09/02/2019 04:07 AM    LDL, calculated 49 09/02/2019 04:07 AM    Triglyceride 275 (H) 09/02/2019 04:07 AM    CHOL/HDL Ratio 3.5 09/02/2019 04:07 AM     Lab Results   Component Value Date/Time    Glucose (POC) 158 (H) 09/02/2019 11:44 AM    Glucose (POC) 99 09/02/2019 07:09 AM    Glucose (POC) 110 (H) 09/01/2019 10:00 PM    Glucose (POC) 95 09/01/2019 05:26 PM    Glucose (POC) 124 (H) 09/01/2019 01:24 PM     Lab Results   Component Value Date/Time    Color YELLOW/STRAW 09/02/2019 09:39 AM    Appearance CLEAR 09/02/2019 09:39 AM    Specific gravity 1.020 09/02/2019 09:39 AM    pH (UA) 5.5 09/02/2019 09:39 AM    Protein NEGATIVE  09/02/2019 09:39 AM    Glucose NEGATIVE  09/02/2019 09:39 AM    Ketone NEGATIVE  09/02/2019 09:39 AM    Bilirubin NEGATIVE  09/02/2019 09:39 AM    Urobilinogen 0.2 09/02/2019 09:39 AM    Nitrites NEGATIVE  09/02/2019 09:39 AM    Leukocyte Esterase SMALL (A) 09/02/2019 09:39 AM    Epithelial cells FEW 09/02/2019 09:39 AM    Bacteria NEGATIVE  09/02/2019 09:39 AM    WBC 10-20 09/02/2019 09:39 AM    RBC 0-5 09/02/2019 09:39 AM         Medications Reviewed:     Current Facility-Administered Medications   Medication Dose Route Frequency    sodium chloride (NS) flush 5-40 mL  5-40 mL IntraVENous Q8H    sodium chloride (NS) flush 5-40 mL  5-40 mL IntraVENous PRN    FLUoxetine (PROzac) capsule 20 mg  20 mg Oral DAILY    diphenhydrAMINE (BENADRYL) capsule 25 mg  25 mg Oral Q4H PRN    atorvastatin (LIPITOR) tablet 40 mg  40 mg Oral QHS    atenolol (TENORMIN) tablet 100 mg  100 mg Oral DAILY    sodium chloride (NS) flush 5-40 mL  5-40 mL IntraVENous Q8H    sodium chloride (NS) flush 5-40 mL  5-40 mL IntraVENous PRN    acetaminophen (TYLENOL) tablet 650 mg  650 mg Oral Q4H PRN    Or    acetaminophen (TYLENOL) solution 650 mg  650 mg Per NG tube Q4H PRN    Or    acetaminophen (TYLENOL) suppository 650 mg  650 mg Rectal Q4H PRN    ondansetron (ZOFRAN) injection 4 mg  4 mg IntraVENous Q6H PRN    bisacodyl (DULCOLAX) tablet 5 mg  5 mg Oral DAILY PRN    famotidine (PEPCID) tablet 20 mg  20 mg Oral Q12H    heparin (porcine) injection 5,000 Units  5,000 Units SubCUTAneous Q8H    insulin lispro (HUMALOG) injection   SubCUTAneous AC&HS    glucose chewable tablet 16 g  4 Tab Oral PRN  glucagon (GLUCAGEN) injection 1 mg  1 mg IntraMUSCular PRN    dextrose 10% infusion 125-250 mL  125-250 mL IntraVENous PRN     ______________________________________________________________________  EXPECTED LENGTH OF STAY: - - -  ACTUAL LENGTH OF STAY:          0                 Beth Parks MD

## 2019-09-02 NOTE — PROGRESS NOTES
Problem: Pain  Goal: *Control of Pain  Outcome: Progressing Towards Goal     Problem: Pressure Injury - Risk of  Goal: *Prevention of pressure injury  Description  Document Nadir Scale and appropriate interventions in the flowsheet. Outcome: Progressing Towards Goal  Note:   Pressure Injury Interventions: Activity Interventions: Increase time out of bed, Pressure redistribution bed/mattress(bed type), PT/OT evaluation         Nutrition Interventions: Document food/fluid/supplement intake                     Problem: Patient Education: Go to Patient Education Activity  Goal: Patient/Family Education  Outcome: Progressing Towards Goal     Problem: Falls - Risk of  Goal: *Absence of Falls  Description  Document Mason Dorado Fall Risk and appropriate interventions in the flowsheet.   Outcome: Progressing Towards Goal  Note:   Fall Risk Interventions:  Mobility Interventions: Bed/chair exit alarm, Patient to call before getting OOB, PT Consult for mobility concerns, Utilize walker, cane, or other assistive device                   History of Falls Interventions: Bed/chair exit alarm, Evaluate medications/consider consulting pharmacy, Investigate reason for fall, Assess for delayed presentation/identification of injury for 48 hrs (comment for end date)         Problem: TIA/CVA Stroke: 0-24 hours  Goal: Medications  Outcome: Progressing Towards Goal  Goal: Treatments/Interventions/Procedures  Outcome: Progressing Towards Goal

## 2019-09-02 NOTE — CONSULTS
Neurology Consult    Patient ID:  Padma Vargas  [id number]  48 y.o.  1955    Subjective:     Date of Consultation:  September 2, 2019    Referring Jesse Holliday MD    Reason for Consultation:  EVAL AND TREAT TIA/CVA    History of Present Illness:   Padma Vargas is a 59 y.o. white  female with a history of hypertension, dyslipidemia, and type 2 diabetes  who presents with c/o of dizziness, unsteadiness and headache. On the day of this admission, as she stood up, walking out of the [employer], suddenly developed dizziness with \"loss of balance\" and feeling weakness in legs and shortly after headache developed. . However she was able to walk to her car and drove home and then called her daughert and brought to ER at [facility]. CT head was negative and then sent to [facility] [place]. She has similar symptoms intermittently, few times a week to 2-3 times a day, lasted from hours to all day. She has w/u with neuroimaging in the past. She has hx of \"bad migraine\". Current sx of episodic dizziness associated with headache. No other associated neurological dysfunction sx.     Past Medical History:   Diagnosis Date    Breast cancer ([address] 1/11/2011    Cancer ([address]     BREAST,left    Diabetes ([address]     GERD (gastroesophageal reflux disease)     Hypertension       Past Surgical History:   Procedure Laterality Date    BREAST SURGERY PROCEDURE UNLISTED  1989    L BREAST LUMPECTOMY    BREAST SURGERY PROCEDURE UNLISTED  1990    BENIGN TUMOR L     HX GYN  2001    partial hysterectomy    HX HEENT  1965    TONSILLECTOMY    HX HEENT  2008    ARI CATARACTS    HX MASTECTOMY      bilateral 2011    HX ORTHOPAEDIC  2004    ARTHROSCOPY R KNEE    HX OTHER SURGICAL  1993    BENIGN TUMOR NECK    HX OTHER SURGICAL  2004    R UNDER ARM-TUMOR      Family History   Problem Relation Age of Onset    Other Father       Social History     Tobacco Use    Smoking status: Former Smoker   Substance Use Topics    Alcohol use: Yes     Comment: OCCASIONAL      Allergies   Allergen Reactions    Ibuprofen Hives     HIGH DOSES-PRESCRIPTION STRENGTH    Maxzide [Triamterene-Hydrochlorothiazid] Other (comments)     TREMORS      Vasotec [Enalapril Maleate] Other (comments)     TREMORS        Prior to Admission medications    Medication Sig Start Date End Date Taking? Authorizing Provider   fluoxetine (PROZAC) 20 mg capsule Take  by mouth daily. Yes Provider, Historical   pioglitazone-metformin (ACTOPLUS MET)  mg per tablet Take 1 Tab by mouth two (2) times daily (with meals). Yes Provider, Historical   atorvastatin (LIPITOR) 40 mg tablet Take  by mouth nightly. 2/24/11  Yes Provider, Historical   diphenhydrAMINE (BENADRYL) 25 mg capsule Take 25 mg by mouth as needed. Yes Provider, Historical   CYANOCOBALAMIN, VITAMIN B-12, (VITAMIN B-12 PO) Take  by mouth. Yes Provider, Historical   CALCIUM CARBONATE/VITAMIN D3 (CALCIUM + D PO) Take  by mouth. Yes Provider, Historical   furosemide (LASIX) 20 mg tablet Take  by mouth daily. Provider, Historical   oxycodone-acetaminophen (TYLOX) 5-500 mg per capsule Take 1 Cap by mouth every four (4) hours as needed for Pain. 2/25/11   Darnell Zafar, VINOD   atenolol (TENORMIN) 100 mg tablet Take 100 mg by mouth daily. Provider, Historical   valsartan (DIOVAN) 320 mg tablet Take  by mouth daily. Provider, Historical   MULTIVITAMIN PO Take  by mouth.     Provider, Historical       Review of Systems:  See the HPI for neurologic and I reviewed all other pertinent review of systems as the this chart, otherwise the following systems are noncontributory including constitutional, eyes, ears, nose, and throat, cardiovascular, respiratory, gastrointestinal, genitourinary, musculoskeletal, skin and/ endocrine, hematologic/lymph, allergic/immunologic and psychiatric       Objective:     Patient Vitals for the past 8 hrs:   BP Temp Pulse Resp SpO2 Weight   09/02/19 0813 162/76  77      09/02/19 0618 148/83 97.7 °F (36.5 °C) 73 14 99 %    09/02/19 0400      97.4 kg (214 lb 11.7 oz)   09/02/19 0214 156/60 97.9 °F (36.6 °C) 78 15 97 %        NEUROLOGICAL FOCUS EXAMINATION:   Mental Status: Awake, alert, oriented to time, place, and person; appropriate. Recent and remote memory intact. No evidence of right-left confusion, finger agnosia or aphasia. CRANIAL NERVES:   II: Visual fields full to confrontation. III, IV, VI: Extraocular movements full throughout, without nystagmus. No ptosis. Pupils equal, round and react briskly to light and accommodation. V: Normal sensation to light touch and pinprick bilaterally. Motor function normal.   VII: No facial asymmetry. VIII: Hears finger rub bilaterally. IX & X: Palate elevates symmetrically bilaterally with phonation. Gag reflex equal bilaterally. XI: Sternocleidomastoid and upper trapezius normal tone, bulk and strength bilaterally. XII: Tongue midline without atrophy or fasciculations. No dysarthria. Motor:   Tone, bulk, and strength are age appropriate normal in both upper/lower extremities. There are no tremors, fasciculations or abnormal involuntary movements. Reflexes (right/left):  DTRs, 0-1+  Plantar responses are flexor bilaterally. No pathological reflexes. Coordination: Finger-nose-finger testing , normal in both upper extremities. Gait: normal  Sensation: Intact to pinprick, light touch  throughout. Labs:as listed, all reviewed. CT Head:  No evidence of acute process   CTA Head:  1. No evidence of large vessel occlusion, flow-limiting stenosis or aneurysm. 2. Moderate focal stenosis of the left P2 segment.     CTA Neck:  1. No evidence of flow-limiting stenosis. 2. 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    Plan:    For acute episode, may try vestibular suppressant, ie valium [place],    For prophylactic , may use Topamax 50 -100 mg / as starting dose with gradual titration. Trigger identification/avoidance. To follow with neurology at outpatient for further management and  monitoring carotid status.       Signed By:  Kirby Stafford MD     September 2, 2019

## 2019-09-02 NOTE — H&P
[address]  HISTORY AND PHYSICAL    Name:  Guillermo Garay  MR#:  [id number]  :  1955  ACCOUNT #:  [de-identified]  ADMIT DATE:  2019      The patient was seen, evaluated, and admitted by me on 2019. PRIMARY CARE PHYSICIAN:  Jodee Henry MD    SOURCE OF INFORMATION:  Patient. CHIEF COMPLAINT:  Slurred speech and dizziness. HISTORY OF PRESENT ILLNESS:  This is a 77-year-old woman with past medical history significant for hypertension, dyslipidemia, and type 2 diabetes was in her usual state of health until the day of her presentation at the emergency room when the patient developed slurred speech and dizziness. The patient's symptoms started at about 12:45 at a [employer]. The patient described the dizziness as room spinning around her. She lost her balance as a result of the dizziness and fell on to a wall. The patient also complained of associated shortness of breath as well as palpitations. She stated that she has had multiple episodes of these symptoms in the past couple of weeks, but did not tell anybody. The patient was advised to go to the emergency room for further evaluation. She was taken to [facility] at [facility]. When the patient arrived at the emergency room, code stroke was called. The CT scan of the head was obtained. This did not show any acute pathology. The emergency room physician consulted neurologist through the Teleneurology service. It was determined that the patient is not a tPA candidate, but admission to the hospital for further evaluation was advised. The patient was referred to the hospitalist service for that purpose. No history of fever, no rigors, and no chills. The patient has no record of prior admission to this hospital.    PAST MEDICAL HISTORY:  1. Hypertension. 2.  Dyslipidemia. 3.  Type 2 diabetes. ALLERGIES:  THE PATIENT IS ALLERGIC TO VASOTEC, MAXZIDE, IBUPROFEN. MEDICATIONS:  1. Lipitor 40 mg daily. 2.  Benadryl 25 mg daily as needed. 3.  Prozac 20 mg daily. 4.  Actoplus Met 15/500, 1 tablet twice daily. FAMILY HISTORY:  This was reviewed. Her father had heart disease and  of lung cancer. Mother had heart disease and  of breast cancer. PAST SURGICAL HISTORY:  This is significant for hysterectomy and double mastectomy secondary to breast cancer. SOCIAL HISTORY:  The patient is a former smoker. No history of alcohol abuse. REVIEW OF SYSTEMS:  HEAD, EYES, EARS, NOSE AND THROAT:  This is positive for dizziness. No headache. No blurring of vision. No photophobia. RESPIRATORY SYSTEM:  This is positive for shortness of breath. No cough. No hemoptysis. CARDIOVASCULAR SYSTEM:  This is positive for palpitation. No chest pain. No orthopnea. GASTROINTESTINAL SYSTEM:  No nausea or vomiting. No diarrhea. No constipation. GENITOURINARY SYSTEM:  No dysuria, no urgency, and no frequency. All other systems are reviewed and they are negative. PHYSICAL EXAMINATION:  GENERAL APPEARANCE:  The patient appeared ill and in moderate distress. VITAL SIGNS:  On arrival at this emergency room; temperature 98.4, pulse 87, respiratory rate 17, blood pressure 154/82, oxygen saturation 99% on room air. HEAD:  Normocephalic, atraumatic. EYES:  Normal eye movements. No redness, no drainage, no discharge. EARS:  Normal external ears with no evidence of drainage. NOSE:  No deformity. No drainage. MOUTH AND THROAT:  No visible oral lesion. NECK:  Neck is supple. No JVD. No thyromegaly. CHEST:  Clear breath sounds. No wheezing. No crackles. HEART:  Normal S1 and S2.  Regular. No clinically appreciable murmur. ABDOMEN:  Soft, obese, nontender, and normal bowel sounds. CNS:  Alert and oriented x3. No gross focal neurological deficit. EXTREMITIES:  No edema. Pulses 2+ bilaterally. MUSCULOSKELETAL SYSTEM:  No obvious joint deformity or swelling.   SKIN:  No active skin lesions seen in the exposed part of the body. PSYCHIATRY:  Normal mood and affect. LYMPHATIC SYSTEM:  No cervical lymphadenopathy. DIAGNOSTIC DATA:  The EKG shows normal sinus rhythm. No significant ST and T-wave abnormalities. CT scan of the head, no evidence of acute process. Chest x-ray, no acute cardiopulmonary process. The CTA of the head and neck, no evidence of large vessel occlusion. LABORATORY DATA:  Hematology; WBC 8.1, hemoglobin 12.3, hematocrit 37.8, platelets of 167. Coagulation profile; INR 0.9, PT 9.2, magnesium 1.8. Cardiac profile, troponin less than 0.05. Pro-. Chemistry; sodium 140, potassium 3.8, chloride 102, CO2 is 26, glucose 117, BUN 13, creatinine 1.14, calcium 8.7, total bilirubin 0.3, ALT 28, AST 18, alkaline phosphatase 87, total protein at 7.1, albumin level 3.6, and globulin 3.5. ASSESSMENT:  1. Suspected acute cerebrovascular accident. 2.  Hypertension. 3.  Dyslipidemia. 4.  Type 2 diabetes. 5.  Elevated BNP level. 6.  Morbid obesity, unspecified. PLAN:  1. Suspected acute CVA. We will admit the patient for further evaluation and treatment. We will obtain MRI of the brain. Inpatient Neurology consult will be requested to assist in further evaluation and treatment. We will check lipid profile. We will check hemoglobin A1c level. We will also check B12 level. We will check an ESR and C-reactive protein level to evaluate the patient for systemic inflammation. We will await further recommendation from the inpatient neurologist.  2.  Hypertension. We will resume preadmission medication. We will monitor the patient's blood pressure closely. 3.  Dyslipidemia. We will continue with the home medication. We will check lipid profile as stated above. 4.  Type 2 diabetes. The patient will be placed on sliding scale with insulin coverage.   We will hold oral agents till the time of discharge from the hospital.  We will check hemoglobin A1c level as stated above. 5.  Elevated BNP level. We will await results of echocardiogram to determine whether this is cardiac in origin. Since the patient also presented with shortness of breath, we will check D-dimer to evaluate the patient for thromboembolism as a possible cause of shortness of breath. 6.  Morbid obesity, unspecified. Dietary consult will be requested for dietary counseling. 7.  Other issues. Code status, the patient is a full code. We will place the patient on heparin for DVT prophylaxis. FUNCTIONAL STATUS PRIOR TO ADMISSION:  The patient is ambulatory with a cane outside the house.       Jeniffer Duran MD      [id number]  D:  09/02/2019 5:54  T:  09/02/2019 6:00  JOB #:  [id number]  CC:  Jodee Henry MD

## 2019-09-02 NOTE — PROGRESS NOTES
Speech pathology  Orders received, chart reviewed. Patient in with dizziness. Reviewed neurologist's note and patient with no aphasia or dysarthria. She passed STAND and is on a diet. PT evaluated and discharged. Formal speech/swallow evaluation not indicated at this time. Will complete orders.  Thanks, Dalia Pacheco M.S. CCC-SLP

## 2019-09-03 ENCOUNTER — APPOINTMENT (OUTPATIENT)
Dept: MRI IMAGING | Age: 64
End: 2019-09-03
Attending: INTERNAL MEDICINE
Payer: COMMERCIAL

## 2019-09-03 ENCOUNTER — APPOINTMENT (OUTPATIENT)
Dept: NON INVASIVE DIAGNOSTICS | Age: 64
End: 2019-09-03
Attending: INTERNAL MEDICINE
Payer: COMMERCIAL

## 2019-09-03 LAB
ANION GAP SERPL CALC-SCNC: 8 MMOL/L (ref 5–15)
BACTERIA SPEC CULT: NORMAL
BUN SERPL-MCNC: 13 MG/DL (ref 6–20)
BUN/CREAT SERPL: 14 (ref 12–20)
CALCIUM SERPL-MCNC: 9.2 MG/DL (ref 8.5–10.1)
CC UR VC: NORMAL
CHLORIDE SERPL-SCNC: 105 MMOL/L (ref 97–108)
CO2 SERPL-SCNC: 25 MMOL/L (ref 21–32)
COMMENT, HOLDF: NORMAL
CREAT SERPL-MCNC: 0.94 MG/DL (ref 0.55–1.02)
ECHO AO ROOT DIAM: 3.09 CM
ECHO LV E' LATERAL VELOCITY: 6.13 CM/S
ECHO LV E' SEPTAL VELOCITY: 4.41 CM/S
ECHO PULMONARY ARTERY SYSTOLIC PRESSURE (PASP): 25 MMHG
GLUCOSE BLD STRIP.AUTO-MCNC: 129 MG/DL (ref 65–100)
GLUCOSE BLD STRIP.AUTO-MCNC: 133 MG/DL (ref 65–100)
GLUCOSE BLD STRIP.AUTO-MCNC: 142 MG/DL (ref 65–100)
GLUCOSE BLD STRIP.AUTO-MCNC: 159 MG/DL (ref 65–100)
GLUCOSE SERPL-MCNC: 110 MG/DL (ref 65–100)
MAGNESIUM SERPL-MCNC: 2.3 MG/DL (ref 1.6–2.4)
PHOSPHATE SERPL-MCNC: 5.1 MG/DL (ref 2.6–4.7)
POTASSIUM SERPL-SCNC: 3.9 MMOL/L (ref 3.5–5.1)
SAMPLES BEING HELD,HOLD: NORMAL
SERVICE CMNT-IMP: ABNORMAL
SERVICE CMNT-IMP: NORMAL
SODIUM SERPL-SCNC: 138 MMOL/L (ref 136–145)

## 2019-09-03 PROCEDURE — 74011250637 HC RX REV CODE- 250/637: Performed by: INTERNAL MEDICINE

## 2019-09-03 PROCEDURE — 93306 TTE W/DOPPLER COMPLETE: CPT

## 2019-09-03 PROCEDURE — 74011250636 HC RX REV CODE- 250/636: Performed by: INTERNAL MEDICINE

## 2019-09-03 PROCEDURE — 36415 COLL VENOUS BLD VENIPUNCTURE: CPT

## 2019-09-03 PROCEDURE — 96372 THER/PROPH/DIAG INJ SC/IM: CPT

## 2019-09-03 PROCEDURE — 82962 GLUCOSE BLOOD TEST: CPT

## 2019-09-03 PROCEDURE — 80048 BASIC METABOLIC PNL TOTAL CA: CPT

## 2019-09-03 PROCEDURE — 84100 ASSAY OF PHOSPHORUS: CPT

## 2019-09-03 PROCEDURE — 70551 MRI BRAIN STEM W/O DYE: CPT

## 2019-09-03 PROCEDURE — 74011636637 HC RX REV CODE- 636/637: Performed by: INTERNAL MEDICINE

## 2019-09-03 PROCEDURE — 99218 HC RM OBSERVATION: CPT

## 2019-09-03 PROCEDURE — 83735 ASSAY OF MAGNESIUM: CPT

## 2019-09-03 RX ADMIN — ATORVASTATIN CALCIUM 40 MG: 20 TABLET, FILM COATED ORAL at 22:28

## 2019-09-03 RX ADMIN — HEPARIN SODIUM 5000 UNITS: 5000 INJECTION INTRAVENOUS; SUBCUTANEOUS at 22:27

## 2019-09-03 RX ADMIN — Medication 10 ML: at 22:28

## 2019-09-03 RX ADMIN — FLUOXETINE 20 MG: 20 CAPSULE ORAL at 08:02

## 2019-09-03 RX ADMIN — LOPERAMIDE HYDROCHLORIDE 2 MG: 2 CAPSULE ORAL at 09:01

## 2019-09-03 RX ADMIN — INSULIN LISPRO 2 UNITS: 100 INJECTION, SOLUTION INTRAVENOUS; SUBCUTANEOUS at 17:19

## 2019-09-03 RX ADMIN — ASPIRIN 81 MG 81 MG: 81 TABLET ORAL at 08:02

## 2019-09-03 RX ADMIN — Medication 10 ML: at 06:15

## 2019-09-03 RX ADMIN — Medication 10 ML: at 06:16

## 2019-09-03 RX ADMIN — Medication 10 ML: at 12:26

## 2019-09-03 RX ADMIN — ATENOLOL 100 MG: 50 TABLET ORAL at 08:02

## 2019-09-03 RX ADMIN — FAMOTIDINE 20 MG: 20 TABLET ORAL at 22:28

## 2019-09-03 RX ADMIN — HEPARIN SODIUM 5000 UNITS: 5000 INJECTION INTRAVENOUS; SUBCUTANEOUS at 14:45

## 2019-09-03 RX ADMIN — HEPARIN SODIUM 5000 UNITS: 5000 INJECTION INTRAVENOUS; SUBCUTANEOUS at 06:15

## 2019-09-03 RX ADMIN — FAMOTIDINE 20 MG: 20 TABLET ORAL at 08:02

## 2019-09-03 NOTE — PROGRESS NOTES
Problem: Pain  Goal: *Control of Pain  Outcome: Progressing Towards Goal  Goal: *PALLIATIVE CARE:  Alleviation of Pain  Outcome: Progressing Towards Goal     Problem: TIA/CVA Stroke: Day 2 Until Discharge  Goal: Nutrition/Diet  Outcome: Progressing Towards Goal  Goal: Discharge Planning  Outcome: Progressing Towards Goal  Goal: Medications  Outcome: Progressing Towards Goal  Goal: Respiratory  Outcome: Progressing Towards Goal  Goal: Treatments/Interventions/Procedures  Outcome: Progressing Towards Goal  Goal: Psychosocial  Outcome: Progressing Towards Goal  Goal: *Verbalizes anxiety and depression are reduced or absent  Outcome: Progressing Towards Goal  Goal: *Absence of aspiration  Outcome: Progressing Towards Goal  Goal: *Absence of deep venous thrombosis signs and symptoms(Stroke Metric)  Outcome: Progressing Towards Goal

## 2019-09-03 NOTE — PROGRESS NOTES
Problem: Pain  Goal: *Control of Pain  Outcome: Progressing Towards Goal  Goal: *PALLIATIVE CARE:  Alleviation of Pain  Outcome: Progressing Towards Goal     Problem: Patient Education: Go to Patient Education Activity  Goal: Patient/Family Education  Outcome: Progressing Towards Goal     Problem: Pressure Injury - Risk of  Goal: *Prevention of pressure injury  Description  Document Nadir Scale and appropriate interventions in the flowsheet. Outcome: Progressing Towards Goal  Note:   Pressure Injury Interventions: Activity Interventions: Pressure redistribution bed/mattress(bed type), Increase time out of bed, PT/OT evaluation         Nutrition Interventions: Offer support with meals,snacks and hydration                     Problem: Patient Education: Go to Patient Education Activity  Goal: Patient/Family Education  Outcome: Progressing Towards Goal     Problem: Falls - Risk of  Goal: *Absence of Falls  Description  Document Julianna Ricardo Fall Risk and appropriate interventions in the flowsheet.   Outcome: Progressing Towards Goal  Note:   Fall Risk Interventions:  Mobility Interventions: Communicate number of staff needed for ambulation/transfer, OT consult for ADLs, Patient to call before getting OOB, PT Consult for mobility concerns, PT Consult for assist device competence         Medication Interventions: Evaluate medications/consider consulting pharmacy, Patient to call before getting OOB, Teach patient to arise slowly         History of Falls Interventions: Consult care management for discharge planning, Door open when patient unattended, Evaluate medications/consider consulting pharmacy         Problem: Patient Education: Go to Patient Education Activity  Goal: Patient/Family Education  Outcome: Progressing Towards Goal     Problem: Patient Education: Go to Patient Education Activity  Goal: Patient/Family Education  Outcome: Progressing Towards Goal     Problem: TIA/CVA Stroke: 0-24 hours  Goal: Off Pathway (Use only if patient is Off Pathway)  Outcome: Progressing Towards Goal  Goal: Activity/Safety  Outcome: Progressing Towards Goal  Goal: Consults, if ordered  Outcome: Progressing Towards Goal  Goal: Diagnostic Test/Procedures  Outcome: Progressing Towards Goal  Goal: Nutrition/Diet  Outcome: Progressing Towards Goal  Goal: Discharge Planning  Outcome: Progressing Towards Goal  Goal: Medications  Outcome: Progressing Towards Goal  Goal: Respiratory  Outcome: Progressing Towards Goal  Goal: Treatments/Interventions/Procedures  Outcome: Progressing Towards Goal  Goal: Minimize risk of bleeding post-thrombolytic infusion  Outcome: Progressing Towards Goal  Goal: Monitor for complications post-thrombolytic infusion  Outcome: Progressing Towards Goal  Goal: Psychosocial  Outcome: Progressing Towards Goal  Goal: *Hemodynamically stable  Outcome: Progressing Towards Goal  Goal: *Neurologically stable  Description  Absence of additional neurological deficits    Outcome: Progressing Towards Goal  Goal: *Verbalizes anxiety and depression are reduced or absent  Outcome: Progressing Towards Goal  Goal: *Absence of Signs of Aspiration on Current Diet  Outcome: Progressing Towards Goal  Goal: *Absence of deep venous thrombosis signs and symptoms(Stroke Metric)  Outcome: Progressing Towards Goal  Goal: *Ability to perform ADLs and demonstrates progressive mobility and function  Outcome: Progressing Towards Goal  Goal: *Stroke education started(Stroke Metric)  Outcome: Progressing Towards Goal  Goal: *Dysphagia screen performed(Stroke Metric)  Outcome: Progressing Towards Goal  Goal: *Rehab consulted(Stroke Metric)  Outcome: Progressing Towards Goal     Problem: TIA/CVA Stroke: Day 2 Until Discharge  Goal: Off Pathway (Use only if patient is Off Pathway)  Outcome: Progressing Towards Goal  Goal: Activity/Safety  Outcome: Progressing Towards Goal  Goal: Diagnostic Test/Procedures  Outcome: Progressing Towards Goal  Goal: Nutrition/Diet  Outcome: Progressing Towards Goal  Goal: Discharge Planning  Outcome: Progressing Towards Goal  Goal: Medications  Outcome: Progressing Towards Goal  Goal: Respiratory  Outcome: Progressing Towards Goal  Goal: Treatments/Interventions/Procedures  Outcome: Progressing Towards Goal  Goal: Psychosocial  Outcome: Progressing Towards Goal  Goal: *Verbalizes anxiety and depression are reduced or absent  Outcome: Progressing Towards Goal  Goal: *Absence of aspiration  Outcome: Progressing Towards Goal  Goal: *Absence of deep venous thrombosis signs and symptoms(Stroke Metric)  Outcome: Progressing Towards Goal  Goal: *Optimal pain control at patient's stated goal  Outcome: Progressing Towards Goal  Goal: *Tolerating diet  Outcome: Progressing Towards Goal  Goal: *Ability to perform ADLs and demonstrates progressive mobility and function  Outcome: Progressing Towards Goal  Goal: *Stroke education continued(Stroke Metric)  Outcome: Progressing Towards Goal

## 2019-09-03 NOTE — PROGRESS NOTES
Problem: Pain  Goal: *Control of Pain  Outcome: Progressing Towards Goal     Problem: Pressure Injury - Risk of  Goal: *Prevention of pressure injury  Description  Document Nadir Scale and appropriate interventions in the flowsheet. Outcome: Progressing Towards Goal  Note:   Pressure Injury Interventions: Activity Interventions: Increase time out of bed, Pressure redistribution bed/mattress(bed type), PT/OT evaluation         Nutrition Interventions: Document food/fluid/supplement intake, Offer support with meals,snacks and hydration                     Problem: Falls - Risk of  Goal: *Absence of Falls  Description  Document Winter Moreau Fall Risk and appropriate interventions in the flowsheet.   Outcome: Progressing Towards Goal  Note:   Fall Risk Interventions:  Mobility Interventions: Assess mobility with egress test, Bed/chair exit alarm, Communicate number of staff needed for ambulation/transfer, Patient to call before getting OOB, PT Consult for mobility concerns, PT Consult for assist device competence, Strengthening exercises (ROM-active/passive), Utilize walker, cane, or other assistive device                   History of Falls Interventions: Bed/chair exit alarm, Consult care management for discharge planning, Evaluate medications/consider consulting pharmacy, Investigate reason for fall, Room close to nurse's station, Utilize gait belt for transfer/ambulation         Problem: TIA/CVA Stroke: Day 2 Until Discharge  Goal: Activity/Safety  Outcome: Progressing Towards Goal  Goal: Diagnostic Test/Procedures  Outcome: Progressing Towards Goal  Goal: Nutrition/Diet  Outcome: Progressing Towards Goal  Goal: Discharge Planning  Outcome: Progressing Towards Goal  Goal: Medications  Outcome: Progressing Towards Goal  Goal: Respiratory  Outcome: Progressing Towards Goal  Goal: Treatments/Interventions/Procedures  Outcome: Progressing Towards Goal  Goal: Psychosocial  Outcome: Progressing Towards Goal  Goal: *Verbalizes anxiety and depression are reduced or absent  Outcome: Progressing Towards Goal  Goal: *Absence of aspiration  Outcome: Progressing Towards Goal  Goal: *Absence of deep venous thrombosis signs and symptoms(Stroke Metric)  Outcome: Progressing Towards Goal  Goal: *Optimal pain control at patient's stated goal  Outcome: Progressing Towards Goal  Goal: *Tolerating diet  Outcome: Progressing Towards Goal  Goal: *Ability to perform ADLs and demonstrates progressive mobility and function  Outcome: Progressing Towards Goal  Goal: *Stroke education continued(Stroke Metric)  Outcome: Progressing Towards Goal

## 2019-09-03 NOTE — PROGRESS NOTES
Bedside and Verbal shift change report given to Francia Bhardwaj RN (oncoming nurse) by Romayne Brazen, RN (offgoing nurse). Report included the following information SBAR, Kardex, MAR and Cardiac Rhythm NSR.

## 2019-09-03 NOTE — PROGRESS NOTES
Bedside shift change report given to Bob Yoon RN (oncoming nurse) by Kylie Mendenhall RN (offgoing nurse).  Report included the following information SBAR, ED Summary, Procedure Summary, Intake/Output, MAR, Accordion, Recent Results and Cardiac Rhythm SR.

## 2019-09-03 NOTE — PHYSICIAN ADVISORY
Letter of Status Determination: Current Status   OBSERVATION is Appropriate         Pt Name:  Verenice Haines   MR#  [id number]   [facility]#   [id number]   [address] [place] [address]  653/01  @ [facility]   Hospitalization date  9/1/2019  1:18 PM   Current Attending Physician  Trista Charles, [address] diagnosis  Dizziness   Clinicals    This is a 78-year-old woman with past medical history significant for hypertension, dyslipidemia, and type 2 diabetes was in her usual state of health until the day of her presentation at the emergency room when the patient developed slurred speech and dizziness. The patient's symptoms started at about 12:45 at a [employer]. The patient described the dizziness as room spinning around her. She lost her balance as a result of the dizziness and fell on to a wall. The patient also complained of associated shortness of breath as well as palpitations. She stated that she has had multiple episodes of these symptoms in the past couple of weeks, but did not tell anybody. The patient was advised to go to the emergency room for further evaluation. She was taken to [facility] Emergency Room at [facility]. When the patient arrived at the emergency room, code stroke was called. The CT scan of the head was obtained. This did not show any acute pathology. The emergency room physician consulted neurologist through the Teleneurology service. It was determined that the patient is not a tPA candidate, but admission to the hospital for further evaluation was advised.   Pt continues to be somewhat dizzy, MRI, ECHO PENDING, needs medical optimization    [facility] criteria   Does  NOT apply    STATUS DETERMINATION  On the basis of clinical data, available documentaion, we believe that the current status of this patient as OBSERVATION is Appropriate     The final decision of the patient's hospitalization status depends on the attending physician's judgment    Additional comments     Insurance  Payor: GORDO SUAZO / Plan: Lauryn Saunders [id number] PPO / Product Type: PPO /    Insurance Information                [employer]/[place] [address] PPO Phone:     Subscriber: Yeni De La Torre Subscriber#: [id number]    Group#: [id number] Precert#:                    Nicole Blackwell MD  Cell: [phone]  Physician Advisor

## 2019-09-03 NOTE — PROGRESS NOTES
Hospitalist Progress Note  Gabby Diallo MD  Answering service: [phone] from in house phone        Date of Service:  9/3/2019  NAME:  Jacqueline Lam  :  1955  MRN:  [id number]      Admission Summary: This is a 66-year-old woman with past medical history significant for hypertension, dyslipidemia, and type 2 diabetes was in her usual state of health until the day of her presentation at the emergency room when the patient developed slurred speech and dizziness. The patient's symptoms started at about 12:45 at a [employer]. Interval history / Subjective:   Feels much better. No headaches. No dizziness. No cp/sob/abd pain. She says that she has been up and around on her own. Assessment & Plan:     Dizziness - rule out CVA  - Echo pending  - Neuro consult appreciated. Vertiginous migraine suspected per Dr. Hiren Gupta.  - Suspect may be more related to vertigo, but patient also has some h/o ataxia which is unclear if related to dizziness.  - Add ASA, continue home statin  - A1c 6.1, LDL49  - PT/OT    HLD - home statin  HTN - home meds, continue to monitor   Type 2 diabtes - A1c 6.1% BS   - SSI, POCT glucose checks and hypoglycemia protocol    Addendum:  MRI brain neg for acute infarcts. Echo still pending. Patient reported that she had some dizziness and numbness of mouth briefly after MRI done. Feels okay now. Will monitor overnight. Echo pending. Dc tomorrow if stable. Code status: FULL  DVT prophylaxis: SCDs    Care Plan discussed with: Patient/Family  Disposition: TBD, hopefully today once all tests completed. Hospital Problems  Date Reviewed: 2019          Codes Class Noted POA    * (Principal) Acute CVA (cerebrovascular accident) [facility]) ICD-10-CM: I63.9  ICD-9-CM: 434.91  2019 Yes                Review of Systems:   A comprehensive review of systems was negative except for that written in the HPI. Vital Signs:    Last 24hrs VS reviewed since prior progress note. Most recent are:  Visit Vitals  /75   Pulse 81   Temp 98.1 °F (36.7 °C)   Resp 16   Ht 5' (1.524 m)   Wt 97.2 kg (214 lb 4.6 oz)   SpO2 98%   BMI 43.28 kg/m²       No intake or output data in the 24 hours ending 09/03/19 0853     Physical Examination:             Constitutional:  No acute distress, cooperative, pleasant    ENT:  Oral mucous moist, oropharynx benign. Neck supple,    Resp:  CTA bilaterally. No wheezing/rhonchi/rales. No accessory muscle use   CV:  Regular rhythm, normal rate, no murmurs, gallops, rubs    GI:  Soft, non distended, non tender. normoactive bowel sounds, no hepatosplenomegaly     Musculoskeletal:  No edema, warm, 2+ pulses throughout    Neurologic:  Moves all extremities. AAOx3, CN II-XII reviewed     Skin:  Good turgor, no rashes or ulcers       Data Review:    Review and/or order of clinical lab test  Review and/or order of tests in the radiology section of CPT  Review and/or order of tests in the medicine section of CPT      Labs:     Recent Labs     09/02/19  0407 09/01/19  1345   WBC 7.2 8.1   HGB 12.0 12.3   HCT 37.9 37.8    245     Recent Labs     09/03/19  0547 09/02/19  0407 09/01/19  1345    138 140   K 3.9 4.5 3.8    106 102   CO2 25 26 26   BUN 13 10 13   CREA 0.94 1.06* 1.14*   * 98 117*   CA 9.2 9.2 8.7   MG 2.3 2.1 1.8   PHOS 5.1* 4.9*  --      Recent Labs     09/02/19  0407 09/01/19  1345   SGOT 28 18   ALT 22 28   AP 87 87   TBILI 0.5 0.3   TP 6.9 7.1   ALB 3.4* 3.6   GLOB 3.5 3.5     Recent Labs     09/01/19  1345   INR 0.9   PTP 9.2      No results for input(s): FE, TIBC, PSAT, FERR in the last 72 hours. No results found for: FOL, RBCF   No results for input(s): PH, PCO2, PO2 in the last 72 hours.   Recent Labs     09/02/19  0407 09/01/19  2325 09/01/19  1345   TROIQ <0.05 <0.05 <0.05     Lab Results   Component Value Date/Time    Cholesterol, total 145 09/02/2019 04:07 AM    HDL Cholesterol 41 09/02/2019 04:07 AM    LDL, calculated 49 09/02/2019 04:07 AM    Triglyceride 275 (H) 09/02/2019 04:07 AM    CHOL/HDL Ratio 3.5 09/02/2019 04:07 AM     Lab Results   Component Value Date/Time    Glucose (POC) 129 (H) 09/03/2019 07:05 AM    Glucose (POC) 119 (H) 09/02/2019 10:06 PM    Glucose (POC) 93 09/02/2019 04:21 PM    Glucose (POC) 158 (H) 09/02/2019 11:44 AM    Glucose (POC) 99 09/02/2019 07:09 AM     Lab Results   Component Value Date/Time    Color YELLOW/STRAW 09/02/2019 09:39 AM    Appearance CLEAR 09/02/2019 09:39 AM    Specific gravity 1.020 09/02/2019 09:39 AM    pH (UA) 5.5 09/02/2019 09:39 AM    Protein NEGATIVE  09/02/2019 09:39 AM    Glucose NEGATIVE  09/02/2019 09:39 AM    Ketone NEGATIVE  09/02/2019 09:39 AM    Bilirubin NEGATIVE  09/02/2019 09:39 AM    Urobilinogen 0.2 09/02/2019 09:39 AM    Nitrites NEGATIVE  09/02/2019 09:39 AM    Leukocyte Esterase SMALL (A) 09/02/2019 09:39 AM    Epithelial cells FEW 09/02/2019 09:39 AM    Bacteria NEGATIVE  09/02/2019 09:39 AM    WBC 10-20 09/02/2019 09:39 AM    RBC 0-5 09/02/2019 09:39 AM         Medications Reviewed:     Current Facility-Administered Medications   Medication Dose Route Frequency    aspirin chewable tablet 81 mg  81 mg Oral DAILY    loperamide (IMODIUM) capsule 2 mg  2 mg Oral Q4H PRN    sodium chloride (NS) flush 5-40 mL  5-40 mL IntraVENous Q8H    sodium chloride (NS) flush 5-40 mL  5-40 mL IntraVENous PRN    FLUoxetine (PROzac) capsule 20 mg  20 mg Oral DAILY    diphenhydrAMINE (BENADRYL) capsule 25 mg  25 mg Oral Q4H PRN    atorvastatin (LIPITOR) tablet 40 mg  40 mg Oral QHS    atenolol (TENORMIN) tablet 100 mg  100 mg Oral DAILY    sodium chloride (NS) flush 5-40 mL  5-40 mL IntraVENous Q8H    sodium chloride (NS) flush 5-40 mL  5-40 mL IntraVENous PRN    acetaminophen (TYLENOL) tablet 650 mg  650 mg Oral Q4H PRN    Or    acetaminophen (TYLENOL) solution 650 mg  650 mg Per NG tube Q4H PRN    Or  acetaminophen (TYLENOL) suppository 650 mg  650 mg Rectal Q4H PRN    ondansetron (ZOFRAN) injection 4 mg  4 mg IntraVENous Q6H PRN    bisacodyl (DULCOLAX) tablet 5 mg  5 mg Oral DAILY PRN    famotidine (PEPCID) tablet 20 mg  20 mg Oral Q12H    heparin (porcine) injection 5,000 Units  5,000 Units SubCUTAneous Q8H    insulin lispro (HUMALOG) injection   SubCUTAneous AC&HS    glucose chewable tablet 16 g  4 Tab Oral PRN    glucagon (GLUCAGEN) injection 1 mg  1 mg IntraMUSCular PRN    dextrose 10% infusion 125-250 mL  125-250 mL IntraVENous PRN     ______________________________________________________________________  EXPECTED LENGTH OF STAY: - - -  ACTUAL LENGTH OF STAY:          0                 Danie Hart MD

## 2019-09-03 NOTE — ROUTINE PROCESS
Bedside shift change report given to [address] [place] [address] (oncoming nurse) by Roxene Prader RN (offgoing nurse). Report included the following information SBAR, Kardex, ED Summary, Procedure Summary, Intake/Output, MAR, Accordion, Recent Results and Cardiac Rhythm NSR.

## 2019-09-04 VITALS
HEART RATE: 76 BPM | BODY MASS INDEX: 42.07 KG/M2 | OXYGEN SATURATION: 99 % | TEMPERATURE: 98.4 F | HEIGHT: 60 IN | DIASTOLIC BLOOD PRESSURE: 81 MMHG | WEIGHT: 214.29 LBS | SYSTOLIC BLOOD PRESSURE: 143 MMHG | RESPIRATION RATE: 12 BRPM

## 2019-09-04 LAB
GLUCOSE BLD STRIP.AUTO-MCNC: 111 MG/DL (ref 65–100)
GLUCOSE BLD STRIP.AUTO-MCNC: 128 MG/DL (ref 65–100)
SERVICE CMNT-IMP: ABNORMAL
SERVICE CMNT-IMP: ABNORMAL

## 2019-09-04 PROCEDURE — 74011250637 HC RX REV CODE- 250/637: Performed by: INTERNAL MEDICINE

## 2019-09-04 PROCEDURE — 82962 GLUCOSE BLOOD TEST: CPT

## 2019-09-04 PROCEDURE — 99218 HC RM OBSERVATION: CPT

## 2019-09-04 PROCEDURE — 96372 THER/PROPH/DIAG INJ SC/IM: CPT

## 2019-09-04 PROCEDURE — 74011250636 HC RX REV CODE- 250/636: Performed by: INTERNAL MEDICINE

## 2019-09-04 RX ORDER — TOPIRAMATE 50 MG/1
50 TABLET, FILM COATED ORAL DAILY
Qty: 30 TAB | Refills: 1 | Status: SHIPPED | OUTPATIENT
Start: 2019-09-04

## 2019-09-04 RX ORDER — TOPIRAMATE 50 MG/1
50 TABLET, FILM COATED ORAL DAILY
Qty: 30 TAB | Refills: 1 | Status: SHIPPED | OUTPATIENT
Start: 2019-09-04 | End: 2019-09-04 | Stop reason: SDUPTHER

## 2019-09-04 RX ADMIN — HEPARIN SODIUM 5000 UNITS: 5000 INJECTION INTRAVENOUS; SUBCUTANEOUS at 06:00

## 2019-09-04 RX ADMIN — ATENOLOL 100 MG: 50 TABLET ORAL at 08:43

## 2019-09-04 RX ADMIN — FLUOXETINE 20 MG: 20 CAPSULE ORAL at 08:42

## 2019-09-04 RX ADMIN — Medication 10 ML: at 05:50

## 2019-09-04 RX ADMIN — ASPIRIN 81 MG 81 MG: 81 TABLET ORAL at 08:46

## 2019-09-04 RX ADMIN — FAMOTIDINE 20 MG: 20 TABLET ORAL at 08:43

## 2019-09-04 NOTE — PROGRESS NOTES
Pharmacist Discharge Medication Reconciliation    Discharging Provider: Dr. Scott Led PMH:   Past Medical History:   Diagnosis Date    Breast cancer ([facility] 1/11/2011    Cancer ([facility]     BREAST,left    Diabetes ([facility]     GERD (gastroesophageal reflux disease)     Hypertension      Chief Complaint for this Admission:   Chief Complaint   Patient presents with    Dysarthria    Respiratory Distress     Allergies: Ibuprofen; Maxzide [triamterene-hydrochlorothiazid]; and Vasotec [enalapril maleate]    Discharge Medications:   Current Discharge Medication List        START taking these medications    Details   topiramate (TOPAMAX) 50 mg tablet Take 1 Tab by mouth daily. Qty: 30 Tab, Refills: 1           CONTINUE these medications which have NOT CHANGED    Details   fluoxetine (PROZAC) 20 mg capsule Take  by mouth daily. pioglitazone-metformin (ACTOPLUS MET)  mg per tablet Take 1 Tab by mouth two (2) times daily (with meals). atorvastatin (LIPITOR) 40 mg tablet Take  by mouth nightly. diphenhydrAMINE (BENADRYL) 25 mg capsule Take 25 mg by mouth as needed. CYANOCOBALAMIN, VITAMIN B-12, (VITAMIN B-12 PO) Take  by mouth. CALCIUM CARBONATE/VITAMIN D3 (CALCIUM + D PO) Take  by mouth. furosemide (LASIX) 20 mg tablet Take  by mouth daily. oxycodone-acetaminophen (TYLOX) 5-500 mg per capsule Take 1 Cap by mouth every four (4) hours as needed for Pain. Qty: 16 Cap, Refills: 0      atenolol (TENORMIN) 100 mg tablet Take 100 mg by mouth daily. valsartan (DIOVAN) 320 mg tablet Take  by mouth daily. MULTIVITAMIN PO Take  by mouth.              The patient's chart, MAR and AVS were reviewed by Betty Castellanos, PHARMD.

## 2019-09-04 NOTE — PROGRESS NOTES
Problem: Pain  Goal: *Control of Pain  Outcome: Progressing Towards Goal  Goal: *PALLIATIVE CARE:  Alleviation of Pain  Outcome: Progressing Towards Goal     Problem: Patient Education: Go to Patient Education Activity  Goal: Patient/Family Education  Outcome: Progressing Towards Goal     Problem: Pressure Injury - Risk of  Goal: *Prevention of pressure injury  Description  Document Nadir Scale and appropriate interventions in the flowsheet. Outcome: Progressing Towards Goal  Note:   Pressure Injury Interventions: Activity Interventions: Pressure redistribution bed/mattress(bed type), PT/OT evaluation         Nutrition Interventions: Document food/fluid/supplement intake                     Problem: Patient Education: Go to Patient Education Activity  Goal: Patient/Family Education  Outcome: Progressing Towards Goal     Problem: Falls - Risk of  Goal: *Absence of Falls  Description  Document Ena Isabel Fall Risk and appropriate interventions in the flowsheet.   Outcome: Progressing Towards Goal  Note:   Fall Risk Interventions:  Mobility Interventions: Patient to call before getting OOB, OT consult for ADLs, PT Consult for mobility concerns, PT Consult for assist device competence         Medication Interventions: Patient to call before getting OOB, Teach patient to arise slowly         History of Falls Interventions: Consult care management for discharge planning, Door open when patient unattended, Room close to nurse's station         Problem: Patient Education: Go to Patient Education Activity  Goal: Patient/Family Education  Outcome: Progressing Towards Goal     Problem: TIA/CVA Stroke: Day 2 Until Discharge  Goal: Activity/Safety  Outcome: Progressing Towards Goal  Goal: Diagnostic Test/Procedures  Outcome: Progressing Towards Goal  Goal: Nutrition/Diet  Outcome: Progressing Towards Goal  Goal: Discharge Planning  Outcome: Progressing Towards Goal  Goal: Medications  Outcome: Progressing Towards Goal  Goal: Respiratory  Outcome: Progressing Towards Goal  Goal: Treatments/Interventions/Procedures  Outcome: Progressing Towards Goal  Goal: Psychosocial  Outcome: Progressing Towards Goal  Goal: *Verbalizes anxiety and depression are reduced or absent  Outcome: Progressing Towards Goal  Goal: *Absence of aspiration  Outcome: Progressing Towards Goal  Goal: *Absence of deep venous thrombosis signs and symptoms(Stroke Metric)  Outcome: Progressing Towards Goal  Goal: *Optimal pain control at patient's stated goal  Outcome: Progressing Towards Goal  Goal: *Tolerating diet  Outcome: Progressing Towards Goal  Goal: *Ability to perform ADLs and demonstrates progressive mobility and function  Outcome: Progressing Towards Goal  Goal: *Stroke education continued(Stroke Metric)  Outcome: Progressing Towards Goal

## 2019-09-04 NOTE — DISCHARGE SUMMARY
Discharge Summary       PATIENT ID: Karley Arellano  MRN: [id number]   YOB: 1955    DATE OF ADMISSION: 9/1/2019  1:18 PM    DATE OF DISCHARGE: 09/04/19     PRIMARY CARE PROVIDER: Chari Boyd MD       DISCHARGING PROVIDER: Santiago Jones MD    To contact this individual call [phone] and ask the  to page. If unavailable ask to be transferred the Adult Hospitalist Department. CONSULTATIONS: IP CONSULT TO NEUROLOGY    PROCEDURES/SURGERIES: * No surgery found *    Admitting Summary:  Per admitting MD:    CHIEF COMPLAINT:  Slurred speech and dizziness.     HISTORY OF PRESENT ILLNESS:  This is a 79-year-old woman with past medical history significant for hypertension, dyslipidemia, and type 2 diabetes was in her usual state of health until the day of her presentation at the emergency room when the patient developed slurred speech and dizziness. The patient's symptoms started at about 12:45 at a [employer]. The patient described the dizziness as room spinning around her. She lost her balance as a result of the dizziness and fell on to a wall. The patient also complained of associated shortness of breath as well as palpitations. She stated that she has had multiple episodes of these symptoms in the past couple of weeks, but did not tell anybody. The patient was advised to go to the emergency room for further evaluation. She was taken to [facility] Emergency Room at [facility]. When the patient arrived at the emergency room, code stroke was called. The CT scan of the head was obtained. This did not show any acute pathology. The emergency room physician consulted neurologist through the Teleneurology service. It was determined that the patient is not a tPA candidate, but admission to the hospital for further evaluation was advised. The patient was referred to the hospitalist service for that purpose. No history of fever, no rigors, and no chills.   The patient has no record of prior admission to this hospital.    [address] / PLAN:      Pt was admitted for CVA workup and it was neg as noted below. Neurology was consulted and suspect vertiginous migraine. Recommend daily topamax, this has been started. She is to f/u with neurology in 2-3 mths. Diagnostic studies:    CT head: No evidence of acute process     CTA Head:  1. No evidence of large vessel occlusion, flow-limiting stenosis or aneurysm. 2. Moderate focal stenosis of the left P2 segment.     CTA Neck:  1. No evidence of flow-limiting stenosis. 2. Mild stenosis (less than 50% by NASCET criteria) of the proximal bilateral  internal carotid arteries. MRI brain:Normal study. 2D echo:  Mild conc LVH, ef 55-60%, mild TR. neg bubble study    PENDING TEST RESULTS:   At the time of discharge the following test results are still pending: none    FOLLOW UP APPOINTMENTS:    Follow-up Information     Follow up With Specialties Details Why Contact Info    Dasia Alan MD Family Practice In 1 week  [address] [place]   [phone]             ADDITIONAL CARE RECOMMENDATIONS: none    DIET: Diabetic Diet      ACTIVITY: Activity as tolerated    WOUND CARE: NA    EQUIPMENT needed: none      DISCHARGE MEDICATIONS:  Current Discharge Medication List      START taking these medications    Details   topiramate (TOPAMAX) 50 mg tablet Take 1 Tab by mouth daily. Qty: 30 Tab, Refills: 1         CONTINUE these medications which have NOT CHANGED    Details   fluoxetine (PROZAC) 20 mg capsule Take  by mouth daily. pioglitazone-metformin (ACTOPLUS MET)  mg per tablet Take 1 Tab by mouth two (2) times daily (with meals). atorvastatin (LIPITOR) 40 mg tablet Take  by mouth nightly. diphenhydrAMINE (BENADRYL) 25 mg capsule Take 25 mg by mouth as needed. CYANOCOBALAMIN, VITAMIN B-12, (VITAMIN B-12 PO) Take  by mouth. CALCIUM CARBONATE/VITAMIN D3 (CALCIUM + D PO) Take  by mouth. furosemide (LASIX) 20 mg tablet Take  by mouth daily. oxycodone-acetaminophen (TYLOX) 5-500 mg per capsule Take 1 Cap by mouth every four (4) hours as needed for Pain. Qty: 16 Cap, Refills: 0      atenolol (TENORMIN) 100 mg tablet Take 100 mg by mouth daily. valsartan (DIOVAN) 320 mg tablet Take  by mouth daily. MULTIVITAMIN PO Take  by mouth. NOTIFY YOUR PHYSICIAN FOR ANY OF THE FOLLOWING:   Fever over 101 degrees for 24 hours. Chest pain, shortness of breath, fever, chills, nausea, vomiting, diarrhea, change in mentation, falling, weakness, bleeding. Severe pain or pain not relieved by medications. Or, any other signs or symptoms that you may have questions about. DISPOSITION:  x  Home With:   OT  PT  HH  RN       Long term SNF/Inpatient Rehab    Independent/assisted living    Hospice    Other:       PATIENT CONDITION AT DISCHARGE:     Functional status    Poor     Deconditioned    x Independent      Cognition    x Lucid     Forgetful     Dementia      Catheters/lines (plus indication)    Ayoub     PICC     PEG    x None      Code status   x  Full code     DNR      PHYSICAL EXAMINATION AT DISCHARGE:     Blood pressure 143/81, pulse 76, temperature 98.4 °F (36.9 °C), resp. rate 12, height 5' (1.524 m), weight 97.2 kg (214 lb 4.6 oz), SpO2 99 %. Gen: NAD  Lungs: CTA cheryle, no w/c/r  CV: RRR nl s1s2 no mur/r/g  Abd: Soft +BS NT ND  Ext: trace  edema.    Neuro: AAO x 4, grossly nonfocal.       CHRONIC MEDICAL DIAGNOSES:  Problem List as of 9/4/2019 Date Reviewed: 9/1/2019          Codes Class Noted - Resolved    * (Principal) [facility]ce[facility] ICD-10-CM: I63.9  ICD-9-CM: 434.91  9/1/2019 - Present        Breast cancer ([facility] ICD-10-CM: C50.919  ICD-9-CM: 174.9  1/11/2011 - Present              Greater than 40 minutes were spent with the patient on counseling and coordination of care    Signed:   Gwendalyn Landau, MD  9/4/2019  12:16 PM

## 2019-09-04 NOTE — PROGRESS NOTES
Problem: TIA/CVA Stroke: Day 2 Until Discharge  Goal: Activity/Safety  Outcome: Progressing Towards Goal  Goal: Diagnostic Test/Procedures  Outcome: Progressing Towards Goal  Goal: Nutrition/Diet  Outcome: Progressing Towards Goal  Goal: Discharge Planning  Outcome: Progressing Towards Goal  Goal: Medications  Outcome: Progressing Towards Goal  Goal: Respiratory  Outcome: Progressing Towards Goal  Goal: Treatments/Interventions/Procedures  Outcome: Progressing Towards Goal  Goal: Psychosocial  Outcome: Progressing Towards Goal  Goal: *Verbalizes anxiety and depression are reduced or absent  Outcome: Progressing Towards Goal  Goal: *Absence of aspiration  Outcome: Progressing Towards Goal  Goal: *Absence of deep venous thrombosis signs and symptoms(Stroke Metric)  Outcome: Progressing Towards Goal  Goal: *Optimal pain control at patient's stated goal  Outcome: Progressing Towards Goal  Goal: *Tolerating diet  Outcome: Progressing Towards Goal  Goal: *Ability to perform ADLs and demonstrates progressive mobility and function  Outcome: Progressing Towards Goal  Goal: *Stroke education continued(Stroke Metric)  Outcome: Progressing Towards Goal

## 2019-09-04 NOTE — ROUTINE PROCESS
Stroke Education documented in Patient Education: YES  Core Measures Documented in Connect [facility]:  Risk Factors: YES  Warning signs of stroke: YES  When to Activate 911: YES  Medication Education for Risk Factors: YES  Smoking cessation if applicable: YES  Written Education Given:  YES    Discharge NIH Completed: YES  Score: 0    BRAINS: YES    Follow Up Appointment Made: YES  Date/Time if applicable: james   Stroke Education documented in Patient Education: YES  Core Measures Documented in Connect [facility]:  Risk Factors: YES  Warning signs of stroke: YES  When to Activate 911: YES  Medication Education for Risk Factors: YES  Smoking cessation if applicable: YES  Written Education Given:  YES    Discharge NIH Completed: YES  Score: 0    BRAINS: YES    Follow Up Appointment Made: NO  Date/Time if applicable: naBedside RN performed patient education and medication education. Discharge concerns initiated and discussed with patient, including clarification on \"who\" assists the patient at their home and instructions for when the home going patient should call their provider after discharge. Opportunity for questions and clarification was provided. Patient receptive to education: YES  Patient stated: I will start taking my topiramate  Barriers to Education: none  Diagnosis Education given:  YES    Length of stay: 0  Expected Day of Discharge: 9/4/2019  Ask if they have \"Help at Home\" & add to white board?   YES    Education Day #: 3    Medication Education Given:  YES  M in the box Medication name: heparin    Pt aware of HCAHPS survey: YES

## 2019-09-04 NOTE — ROUTINE PROCESS
Bedside and Verbal shift change report given to Selma Almonte RN (oncoming nurse) by Claudene Priestly, RN (offgoing nurse). Report included the following information SBAR, Kardex, Intake/Output, MAR, Recent Results and Cardiac Rhythm NSR.

## 2019-10-11 ENCOUNTER — TELEPHONE (OUTPATIENT)
Dept: NEUROLOGY | Age: 64
End: 2019-10-11

## 2019-10-11 NOTE — TELEPHONE ENCOUNTER
----- Message from Ceci Mayes sent at 10/11/2019 10:45 AM EDT -----  Regarding: Dr. Geoff Dukes Message/Vendor Calls    Caller's first and last name:  pt    Reason for call:  Hospital f/up    Callback required yes/no and why:   yes    Best contact number(s):  [phone], if no answer please leave detail voicemail message. Details to clarify the request:  Pt requesting to scheduled hospital f/up appt at this location due to distance. Pt was d/c from [facility] on 09/04/2019 due to acute CVA. Pt stated she did see a neurologist while in the hospital unsure of who.D/C instructions stated to f/up with neurology within 2 months.       Ceci Mayes

## 2020-11-17 ENCOUNTER — TRANSCRIBE ORDER (OUTPATIENT)
Dept: SCHEDULING | Age: 65
End: 2020-11-17

## 2020-11-17 DIAGNOSIS — M54.50 LOW BACK PAIN: Primary | ICD-10-CM

## 2020-11-20 ENCOUNTER — TRANSCRIBE ORDER (OUTPATIENT)
Dept: SCHEDULING | Age: 65
End: 2020-11-20

## 2020-11-20 DIAGNOSIS — M54.50 LOW BACK PAIN: ICD-10-CM

## 2020-11-20 DIAGNOSIS — Z85.3 PERSONAL HISTORY OF MALIGNANT NEOPLASM OF BREAST: Primary | ICD-10-CM

## 2020-11-20 DIAGNOSIS — M48.00 SPINAL STENOSIS: ICD-10-CM

## 2020-12-01 ENCOUNTER — HOSPITAL ENCOUNTER (OUTPATIENT)
Dept: MRI IMAGING | Age: 65
Discharge: HOME | End: 2020-12-01
Attending: INTERNAL MEDICINE
Payer: MEDICARE

## 2020-12-01 DIAGNOSIS — M54.50 LOW BACK PAIN: ICD-10-CM

## 2020-12-01 DIAGNOSIS — M48.00 SPINAL STENOSIS: ICD-10-CM

## 2020-12-01 DIAGNOSIS — Z85.3 PERSONAL HISTORY OF MALIGNANT NEOPLASM OF BREAST: ICD-10-CM

## 2020-12-01 PROCEDURE — 72148 MRI LUMBAR SPINE W/O DYE: CPT

## 2020-12-01 PROCEDURE — 72146 MRI CHEST SPINE W/O DYE: CPT

## 2021-01-12 ENCOUNTER — TRANSCRIBE ORDER (OUTPATIENT)
Dept: SCHEDULING | Age: 66
End: 2021-01-12

## 2021-01-12 DIAGNOSIS — R42 DIZZINESS: ICD-10-CM

## 2021-01-12 DIAGNOSIS — R11.0 NAUSEA: Primary | ICD-10-CM

## 2021-01-19 ENCOUNTER — HOSPITAL ENCOUNTER (OUTPATIENT)
Dept: MRI IMAGING | Age: 66
Discharge: HOME | End: 2021-01-19
Attending: NEUROLOGICAL SURGERY
Payer: MEDICARE

## 2021-01-19 DIAGNOSIS — R42 DIZZINESS: ICD-10-CM

## 2021-01-19 DIAGNOSIS — R11.0 NAUSEA: ICD-10-CM

## 2021-01-19 PROCEDURE — A9575 INJ GADOTERATE MEGLUMI 0.1ML: HCPCS | Performed by: NEUROLOGICAL SURGERY

## 2021-01-19 PROCEDURE — 74011250636 HC RX REV CODE- 250/636: Performed by: NEUROLOGICAL SURGERY

## 2021-01-19 PROCEDURE — 70553 MRI BRAIN STEM W/O & W/DYE: CPT

## 2021-01-19 RX ORDER — GADOTERATE MEGLUMINE 376.9 MG/ML
19 INJECTION INTRAVENOUS ONCE
Status: COMPLETED | OUTPATIENT
Start: 2021-01-19 | End: 2021-01-19

## 2021-01-19 RX ADMIN — GADOTERATE MEGLUMINE 19 ML: 376.9 INJECTION INTRAVENOUS at 14:07

## 2022-03-19 PROBLEM — I63.9 ACUTE CVA (CEREBROVASCULAR ACCIDENT) (HCC): Status: ACTIVE | Noted: 2019-09-01

## 2023-05-21 RX ORDER — VALSARTAN 320 MG/1
TABLET ORAL DAILY
COMMUNITY

## 2023-05-21 RX ORDER — DIPHENHYDRAMINE HCL 25 MG
25 CAPSULE ORAL PRN
COMMUNITY

## 2023-05-21 RX ORDER — FUROSEMIDE 20 MG/1
TABLET ORAL DAILY
COMMUNITY

## 2023-05-21 RX ORDER — ATENOLOL 100 MG/1
100 TABLET ORAL DAILY
COMMUNITY

## 2023-05-21 RX ORDER — TOPIRAMATE 50 MG/1
50 TABLET, FILM COATED ORAL DAILY
COMMUNITY
Start: 2019-09-04

## 2023-05-21 RX ORDER — PIOGLITAZONE HCL AND METFORMIN HCL 500; 15 MG/1; MG/1
1 TABLET ORAL 2 TIMES DAILY WITH MEALS
COMMUNITY

## 2023-05-21 RX ORDER — ATORVASTATIN CALCIUM 40 MG/1
TABLET, FILM COATED ORAL
COMMUNITY
Start: 2011-02-24

## 2023-05-21 RX ORDER — FLUOXETINE HYDROCHLORIDE 20 MG/1
CAPSULE ORAL DAILY
COMMUNITY

## 2024-12-12 ENCOUNTER — HOSPITAL ENCOUNTER (EMERGENCY)
Facility: HOSPITAL | Age: 69
Discharge: HOME | End: 2024-12-12
Attending: EMERGENCY MEDICINE
Payer: MEDICARE

## 2024-12-12 ENCOUNTER — APPOINTMENT (OUTPATIENT)
Facility: HOSPITAL | Age: 69
End: 2024-12-12
Payer: MEDICARE

## 2024-12-12 VITALS
HEART RATE: 73 BPM | SYSTOLIC BLOOD PRESSURE: 152 MMHG | DIASTOLIC BLOOD PRESSURE: 70 MMHG | TEMPERATURE: 98 F | OXYGEN SATURATION: 100 % | RESPIRATION RATE: 18 BRPM

## 2024-12-12 DIAGNOSIS — T14.8XXA SKIN ABRASION: ICD-10-CM

## 2024-12-12 DIAGNOSIS — M54.50 ACUTE LEFT-SIDED LOW BACK PAIN WITHOUT SCIATICA: Primary | ICD-10-CM

## 2024-12-12 LAB
APPEARANCE UR: CLEAR
BACTERIA URNS QL MICRO: NEGATIVE /HPF
BILIRUB UR QL: NEGATIVE
COLOR UR: NORMAL
EPITH CASTS URNS QL MICRO: NORMAL /LPF
GLUCOSE UR STRIP.AUTO-MCNC: NEGATIVE MG/DL
HGB UR QL STRIP: NEGATIVE
KETONES UR QL STRIP.AUTO: NEGATIVE MG/DL
LEUKOCYTE ESTERASE UR QL STRIP.AUTO: NEGATIVE
NITRITE UR QL STRIP.AUTO: NEGATIVE
PH UR STRIP: 6.5 (ref 5–8)
PROT UR STRIP-MCNC: NEGATIVE MG/DL
RBC #/AREA URNS HPF: NORMAL /HPF (ref 0–5)
SP GR UR REFRACTOMETRY: 1.01 (ref 1–1.03)
UROBILINOGEN UR QL STRIP.AUTO: 0.2 EU/DL (ref 0.2–1)
WBC URNS QL MICRO: NORMAL /HPF (ref 0–4)

## 2024-12-12 PROCEDURE — 99284 EMERGENCY DEPT VISIT MOD MDM: CPT

## 2024-12-12 PROCEDURE — 81001 URINALYSIS AUTO W/SCOPE: CPT

## 2024-12-12 PROCEDURE — 72131 CT LUMBAR SPINE W/O DYE: CPT

## 2024-12-12 RX ORDER — IRBESARTAN 300 MG/1
TABLET ORAL
COMMUNITY

## 2024-12-12 RX ORDER — DULAGLUTIDE 1.5 MG/.5ML
INJECTION, SOLUTION SUBCUTANEOUS
COMMUNITY
Start: 2024-10-23

## 2024-12-12 RX ORDER — NEBIVOLOL 10 MG/1
TABLET ORAL
COMMUNITY

## 2024-12-12 RX ORDER — LIDOCAINE 4 G/G
1 PATCH TOPICAL EVERY 24 HOURS
Qty: 30 PATCH | Refills: 0 | Status: SHIPPED | OUTPATIENT
Start: 2024-12-12 | End: 2025-01-11

## 2024-12-12 ASSESSMENT — ENCOUNTER SYMPTOMS
CONSTIPATION: 0
NAUSEA: 0
SORE THROAT: 0
RHINORRHEA: 0
ABDOMINAL PAIN: 0
DIARRHEA: 0
SHORTNESS OF BREATH: 0
COUGH: 0
COLOR CHANGE: 0
BACK PAIN: 1
VOMITING: 0

## 2024-12-12 ASSESSMENT — PAIN DESCRIPTION - DESCRIPTORS: DESCRIPTORS: ACHING;BURNING

## 2024-12-12 ASSESSMENT — PAIN DESCRIPTION - LOCATION: LOCATION: BACK

## 2024-12-12 ASSESSMENT — PAIN DESCRIPTION - ORIENTATION: ORIENTATION: LOWER;LEFT

## 2024-12-12 NOTE — ED PROVIDER NOTES
[facility]  EMERGENCY DEPARTMENT ENCOUNTER      Pt Name: Dominique Pelayo  MRN: [id number]  Birthdate 1955  Date of evaluation: 12/12/2024  Provider: PHYLLIS Ross    CHIEF COMPLAINT       Chief Complaint   Patient presents with    Back Pain         HISTORY OF PRESENT ILLNESS   (Location/Symptom, Timing/Onset, Context/Setting, Quality, Duration, Modifying Factors, Severity)  Note limiting factors.   69-year-old female with medical history remarkable for breast cancer, GERD and diabetes presenting to the emergency department via EMS for evaluation of left-sided lower back pain described as aching, burning in nature that started following a fall.  Patient reports that she was standing on a 2 step stool trying to get a pain out of a cabinet above the refrigerator when she attempted to grab onto the edge of the refrigerator and it started to fall against her.  She states that she was able to turn and strike the left side of her back against the counter and avoid hitting her head or neck.  She reports having an unknown abrasion to the area of impact.  She states that she felt short of breath but thinks it was due to pain immediately after the fall.  No associated headache, dizziness, extremity numbness, extremity weakness, nausea, vomiting, diarrhea or urinary complaint.  She states that she was in usual state of health prior to the injury.    The history is provided by the patient.         Review of External Medical Records:     Nursing Notes were reviewed.    REVIEW OF SYSTEMS    (2-9 systems for level 4, 10 or more for level 5)     Review of Systems   Constitutional:  Negative for activity change, appetite change and fever.   HENT:  Negative for congestion, ear pain, rhinorrhea and sore throat.    Respiratory:  Negative for cough and shortness of breath.    Cardiovascular:  Negative for chest pain.   Gastrointestinal:  Negative for abdominal pain, constipation, diarrhea, nausea and vomiting.

## 2024-12-12 NOTE — ED TRIAGE NOTES
Patient arrives with cc of pain to the left lower region of back. Reports she was trying to get something out of her cabinet while on a step stool. She lost her footing and started to fall, she grabbed onto the fridge, the fridge began to tip and almost fell on her. Patient hit her back on the corner of the table/cabinet. Denies hitting head, LOC. Patient was able to walk to her phone to call EMS. Patient arrives via EMS and was able to move herself from stretcher to stretcher. [facility] 4.

## 2024-12-12 NOTE — DISCHARGE INSTRUCTIONS
Expect to be more sore tomorrow.  Your CAT scan was reassuring with no evidence of any broken bones in your back.  Gentle stretching.  Tylenol may be taken for pain.  You may also apply the Lidoderm patch to your back where there is pain.  It can be left on for 12 hours and then needs to be removed for 12 hours.  Follow-up with your regular doctor.

## 2024-12-16 ENCOUNTER — APPOINTMENT (OUTPATIENT)
Facility: HOSPITAL | Age: 69
DRG: 493 | End: 2024-12-16
Payer: MEDICARE

## 2024-12-16 ENCOUNTER — HOSPITAL ENCOUNTER (INPATIENT)
Facility: HOSPITAL | Age: 69
LOS: 3 days | Discharge: NURSING, REHAB OR LONG-TERM CARE | DRG: 493 | End: 2024-12-19
Attending: STUDENT IN AN ORGANIZED HEALTH CARE EDUCATION/TRAINING PROGRAM | Admitting: FAMILY MEDICINE
Payer: MEDICARE
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
## 2024-12-16 DIAGNOSIS — S82.842A BIMALLEOLAR ANKLE FRACTURE, LEFT, CLOSED, INITIAL ENCOUNTER: ICD-10-CM

## 2024-12-16 DIAGNOSIS — S82.832A CLOSED FRACTURE OF DISTAL END OF LEFT FIBULA, UNSPECIFIED FRACTURE MORPHOLOGY, INITIAL ENCOUNTER: ICD-10-CM
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
## 2024-12-16 LAB
ALBUMIN SERPL-MCNC: 3.7 G/DL (ref 3.5–5)
ALBUMIN/GLOB SERPL: 1 (ref 1.1–2.2)
ALP SERPL-CCNC: 93 U/L (ref 45–117)
ALT SERPL-CCNC: 20 U/L (ref 12–78)
ANION GAP SERPL CALC-SCNC: 12 MMOL/L (ref 2–12)
AST SERPL-CCNC: 17 U/L (ref 15–37)
BASOPHILS # BLD: 0 K/UL (ref 0–0.1)
BASOPHILS NFR BLD: 0 % (ref 0–1)
BILIRUB SERPL-MCNC: 0.5 MG/DL (ref 0.2–1)
BUN SERPL-MCNC: 31 MG/DL (ref 6–20)
BUN/CREAT SERPL: 24 (ref 12–20)
CALCIUM SERPL-MCNC: 9.1 MG/DL (ref 8.5–10.1)
CHLORIDE SERPL-SCNC: 102 MMOL/L (ref 97–108)
CO2 SERPL-SCNC: 24 MMOL/L (ref 21–32)
CREAT SERPL-MCNC: 1.3 MG/DL (ref 0.55–1.02)
DIFFERENTIAL METHOD BLD: ABNORMAL
EKG ATRIAL RATE: 84 BPM
EKG DIAGNOSIS: NORMAL
EKG P AXIS: -1 DEGREES
EKG P-R INTERVAL: 158 MS
EKG Q-T INTERVAL: 414 MS
EKG QRS DURATION: 90 MS
EKG QTC CALCULATION (BAZETT): 489 MS
EKG R AXIS: 12 DEGREES
EKG T AXIS: 13 DEGREES
EKG VENTRICULAR RATE: 84 BPM
EOSINOPHIL # BLD: 0 K/UL (ref 0–0.4)
EOSINOPHIL NFR BLD: 0 % (ref 0–7)
ERYTHROCYTE [DISTWIDTH] IN BLOOD BY AUTOMATED COUNT: 13.6 % (ref 11.5–14.5)
GLOBULIN SER CALC-MCNC: 3.8 G/DL (ref 2–4)
GLUCOSE SERPL-MCNC: 200 MG/DL (ref 65–100)
HCT VFR BLD AUTO: 42.5 % (ref 35–47)
HGB BLD-MCNC: 13.8 G/DL (ref 11.5–16)
IMM GRANULOCYTES # BLD AUTO: 0 K/UL (ref 0–0.04)
IMM GRANULOCYTES NFR BLD AUTO: 0 % (ref 0–0.5)
LYMPHOCYTES # BLD: 1.4 K/UL (ref 0.8–3.5)
LYMPHOCYTES NFR BLD: 12 % (ref 12–49)
MCH RBC QN AUTO: 29.2 PG (ref 26–34)
MCHC RBC AUTO-ENTMCNC: 32.5 G/DL (ref 30–36.5)
MCV RBC AUTO: 90 FL (ref 80–99)
MONOCYTES # BLD: 0.6 K/UL (ref 0–1)
MONOCYTES NFR BLD: 5 % (ref 5–13)
NEUTS SEG # BLD: 9.7 K/UL (ref 1.8–8)
NEUTS SEG NFR BLD: 83 % (ref 32–75)
NRBC # BLD: 0 K/UL (ref 0–0.01)
NRBC BLD-RTO: 0 PER 100 WBC
PLATELET # BLD AUTO: 221 K/UL (ref 150–400)
PMV BLD AUTO: 8.9 FL (ref 8.9–12.9)
POTASSIUM SERPL-SCNC: 4.8 MMOL/L (ref 3.5–5.1)
PROT SERPL-MCNC: 7.5 G/DL (ref 6.4–8.2)
RBC # BLD AUTO: 4.72 M/UL (ref 3.8–5.2)
SODIUM SERPL-SCNC: 138 MMOL/L (ref 136–145)
WBC # BLD AUTO: 11.8 K/UL (ref 3.6–11)

## 2024-12-16 PROCEDURE — 99285 EMERGENCY DEPT VISIT HI MDM: CPT

## 2024-12-16 PROCEDURE — 73610 X-RAY EXAM OF ANKLE: CPT

## 2024-12-16 PROCEDURE — 93005 ELECTROCARDIOGRAM TRACING: CPT | Performed by: STUDENT IN AN ORGANIZED HEALTH CARE EDUCATION/TRAINING PROGRAM

## 2024-12-16 PROCEDURE — 80053 COMPREHEN METABOLIC PANEL: CPT

## 2024-12-16 PROCEDURE — 1100000000 HC RM PRIVATE

## 2024-12-16 PROCEDURE — 85025 COMPLETE CBC W/AUTO DIFF WBC: CPT

## 2024-12-16 PROCEDURE — 6370000000 HC RX 637 (ALT 250 FOR IP): Performed by: STUDENT IN AN ORGANIZED HEALTH CARE EDUCATION/TRAINING PROGRAM

## 2024-12-16 PROCEDURE — 73562 X-RAY EXAM OF KNEE 3: CPT

## 2024-12-16 PROCEDURE — APPNB30 APP NON BILLABLE TIME 0-30 MINS: Performed by: PHYSICIAN ASSISTANT

## 2024-12-16 PROCEDURE — 70450 CT HEAD/BRAIN W/O DYE: CPT

## 2024-12-16 PROCEDURE — 72125 CT NECK SPINE W/O DYE: CPT

## 2024-12-16 PROCEDURE — 36415 COLL VENOUS BLD VENIPUNCTURE: CPT

## 2024-12-16 RX ORDER — ATORVASTATIN CALCIUM 40 MG/1
40 TABLET, FILM COATED ORAL NIGHTLY
Status: DISCONTINUED | OUTPATIENT
Start: 2024-12-16 | End: 2024-12-19 | Stop reason: HOSPADM

## 2024-12-16 RX ORDER — ACETAMINOPHEN 325 MG/1
650 TABLET ORAL EVERY 6 HOURS
Status: DISCONTINUED | OUTPATIENT
Start: 2024-12-16 | End: 2024-12-19 | Stop reason: HOSPADM

## 2024-12-16 RX ORDER — METOPROLOL SUCCINATE 50 MG/1
100 TABLET, EXTENDED RELEASE ORAL DAILY
Status: DISCONTINUED | OUTPATIENT
Start: 2024-12-17 | End: 2024-12-19 | Stop reason: HOSPADM

## 2024-12-16 RX ORDER — MORPHINE SULFATE 2 MG/ML
2 INJECTION, SOLUTION INTRAMUSCULAR; INTRAVENOUS EVERY 4 HOURS PRN
Status: DISCONTINUED | OUTPATIENT
Start: 2024-12-16 | End: 2024-12-19

## 2024-12-16 RX ORDER — PANTOPRAZOLE SODIUM 40 MG/1
40 TABLET, DELAYED RELEASE ORAL
Status: DISCONTINUED | OUTPATIENT
Start: 2024-12-17 | End: 2024-12-19 | Stop reason: HOSPADM

## 2024-12-16 RX ORDER — OXYCODONE HYDROCHLORIDE 5 MG/1
2.5 TABLET ORAL EVERY 4 HOURS PRN
Status: DISCONTINUED | OUTPATIENT
Start: 2024-12-16 | End: 2024-12-19

## 2024-12-16 RX ORDER — ACETAMINOPHEN 325 MG/1
650 TABLET ORAL
Status: COMPLETED | OUTPATIENT
Start: 2024-12-16 | End: 2024-12-16

## 2024-12-16 RX ORDER — OXYCODONE HYDROCHLORIDE 5 MG/1
5 TABLET ORAL EVERY 4 HOURS PRN
Status: DISCONTINUED | OUTPATIENT
Start: 2024-12-16 | End: 2024-12-19

## 2024-12-16 RX ORDER — DEXTROSE MONOHYDRATE 100 MG/ML
INJECTION, SOLUTION INTRAVENOUS CONTINUOUS PRN
Status: DISCONTINUED | OUTPATIENT
Start: 2024-12-16 | End: 2024-12-19 | Stop reason: HOSPADM

## 2024-12-16 RX ORDER — MULTIVITAMIN WITH IRON
1000 TABLET ORAL DAILY
Status: DISCONTINUED | OUTPATIENT
Start: 2024-12-17 | End: 2024-12-19 | Stop reason: HOSPADM

## 2024-12-16 RX ORDER — LOSARTAN POTASSIUM 50 MG/1
100 TABLET ORAL DAILY
Status: DISCONTINUED | OUTPATIENT
Start: 2024-12-17 | End: 2024-12-19 | Stop reason: HOSPADM

## 2024-12-16 RX ORDER — INSULIN LISPRO 100 [IU]/ML
0-8 INJECTION, SOLUTION INTRAVENOUS; SUBCUTANEOUS
Status: DISCONTINUED | OUTPATIENT
Start: 2024-12-16 | End: 2024-12-19 | Stop reason: HOSPADM

## 2024-12-16 RX ADMIN — ACETAMINOPHEN 650 MG: 325 TABLET ORAL at 18:35

## 2024-12-16 ASSESSMENT — PAIN DESCRIPTION - LOCATION: LOCATION: ANKLE

## 2024-12-16 ASSESSMENT — PAIN SCALES - GENERAL
PAINLEVEL_OUTOF10: 8
PAINLEVEL_OUTOF10: 8

## 2024-12-16 ASSESSMENT — PAIN DESCRIPTION - ORIENTATION: ORIENTATION: LEFT

## 2024-12-16 ASSESSMENT — ENCOUNTER SYMPTOMS
BACK PAIN: 0
SHORTNESS OF BREATH: 0
ABDOMINAL PAIN: 0

## 2024-12-16 NOTE — ED TRIAGE NOTES
Patient arrives with cc of falling this morning around 1030. Reports she felt dizzy and nauseous and went to get her nausea medication and fell. Reports pain to bilateral ankle, more so on the L side. Patient is A & O x4. Denies blood thinners. Denies LOC.

## 2024-12-16 NOTE — PROGRESS NOTES
ORTHO:    Telephone consult with Dr Murillo who describes a 69-year-old female who presented to the emergency department with complaints of left ankle pain after a fall at home earlier today. NVI per ER with imaging showing a mildly displaced bimalleolar left ankle fracture.  Patient deemed poor candidate for discharge home with office follow-up secondary to living alone and other comorbid medical conditions.  Patient to be admitted to [facility] medical service for placement.  Ankle fracture does appear unstable on imaging and will require surgical fixation.  This would likely be done during this admission.  Plan is for operative fixation afternoon of 12/17.  Patient to be kept n.p.o. after midnight tonight.  Place posterior short leg splint with stirrup and maintain strict elevation of left lower extremity.    Patient to be seen formally once arrived at [facility].    Dr Malik Chadwick aware of patient and in agreement with current plan of care    Thank you for allowing us to take part in this patients care.    Michele Mcrae PA-C  Orthopedic Trauma Service  [facility]

## 2024-12-16 NOTE — ED PROVIDER NOTES
[facility]  EMERGENCY DEPARTMENT ENCOUNTER      Pt Name: Dominique Pelayo  MRN: [id number]  Birthdate 1955  Date of evaluation: 12/16/2024  Provider: Chris Murillo DO    CHIEF COMPLAINT       Chief Complaint   Patient presents with    Fall         HISTORY OF PRESENT ILLNESS   (Location/Symptom, Timing/Onset, Context/Setting, Quality, Duration, Modifying Factors, Severity)  Note limiting factors.   This is a 69-year-old female who had a mechanical fall this morning around 1030 felt dizzy and nauseous.  Fell onto the floor.  Patient no longer feels dizzy no longer feels nauseous.  Patient reports rolling her ankle and has bilateral ankle pain unknown whether she struck her head or not no loss of consciousness.  She reports having pain in the right knee, bilateral ankles,            Review of External Medical Records:     Nursing Notes were reviewed.    REVIEW OF SYSTEMS    (2-9 systems for level 4, 10 or more for level 5)     Review of Systems   Respiratory:  Negative for shortness of breath.    Cardiovascular:  Negative for chest pain.   Gastrointestinal:  Negative for abdominal pain.   Musculoskeletal:  Negative for back pain.   Neurological:  Negative for weakness, numbness and headaches.       Except as noted above the remainder of the review of systems was reviewed and negative.       PAST MEDICAL HISTORY     Past Medical History:   Diagnosis Date    Breast cancer (HCC) 1/11/2011    Cancer (HCC)     BREAST,left    Diabetes (HCC)     GERD (gastroesophageal reflux disease)     Hypertension          SURGICAL HISTORY       Past Surgical History:   Procedure Laterality Date    BREAST SURGERY  1990    BENIGN TUMOR L     BREAST SURGERY  1989    L BREAST LUMPECTOMY    GYN  2001    partial hysterectomy    HEENT  1965    TONSILLECTOMY    HEENT  2008    JUAN CATARACTS    MASTECTOMY      bilateral 2011    ORTHOPEDIC SURGERY  2004    ARTHROSCOPY R KNEE    OTHER SURGICAL HISTORY  1993    BENIGN TUMOR NECK  dislocation is demonstrated. There is osteoarthrosis of the   knee with severe medial and moderate to severe patellofemoral compartment joint   space narrowing associated with mild knee varus and prominent marginal   osteophytes. Atherosclerotic calcifications are shown. No joint effusion is   demonstrated.        IMPRESSION: Osteopenia and osteoarthrosis. No fracture demonstrated.               Electronically signed by Edu Lynn MD           LABS:  Labs Reviewed   CBC WITH AUTO DIFFERENTIAL - Abnormal; Notable for the following components:       Result Value    WBC 11.8 (*)     Neutrophils % 83 (*)     Neutrophils Absolute 9.7 (*)     All other components within normal limits   COMPREHENSIVE METABOLIC PANEL - Abnormal; Notable for the following components:    Glucose 200 (*)     BUN 31 (*)     Creatinine 1.30 (*)     BUN/Creatinine Ratio 24 (*)     Est, Glom Filt Rate 45 (*)     Albumin/Globulin Ratio 1.0 (*)     All other components within normal limits   TROPONIN       All other labs were within normal range or not returned as of this dictation.    EMERGENCY DEPARTMENT COURSE and DIFFERENTIAL DIAGNOSIS/MDM:   Vitals:    Vitals:    12/16/24 1600 12/16/24 1645   BP: (!) 141/64 (!) 152/61   Pulse: 68    Resp: 18    Temp: 97.1 °F (36.2 °C)    TempSrc: Tympanic    SpO2: 96% 99%           Medical Decision Making  Differential includes not limited to fracture subluxation, intracranial hemorrhage or fracture    Amount and/or Complexity of Data Reviewed  Labs: ordered.  Radiology: ordered.  ECG/medicine tests: ordered.    Risk  OTC drugs.  Decision regarding hospitalization.            REASSESSMENT     ED Course as of 12/16/24 1834   Mon Dec 16, 2024   1707 ECG performed at 1655 shows normal sinus rhythm, ventricular rate of 84, normal axis normal intervals no STEMI [WG]      ED Course User Index  [WG] Chris Murillo DO Perfect [facility] Consult for Admission  6:34 PM    ED Room Number: SER04/04  Patient Name and  age:  Dominique Pelayo 69 y.o.  female  Working Diagnosis:   1. Fall, initial encounter    2. Closed fracture of distal end of left fibula, unspecified fracture morphology, initial encounter        COVID-19 Suspicion: No  Sepsis present:  No  Reassessment needed: No  Code Status:  Full Code  Readmission: No  Isolation Requirements: no  Recommended Level of Care: med/surg  Department: [place] [facility] - [phone]  Consulting Provider: Dr. Darby    Other:  69-year-old female who presents ED after a fall this morning, she has had multiple falls.  Currently lives by herself, she has medial malleolus fracture and distal fibular fracture on the left.  Otherwise for the rest of her labs imaging are reassuring, discussed case with orthopedics will place her in a splint.  Patient lives by self and would not be weightbearing, will need admission for placement.  Orthopedics to see in consultation.     Total critical care time spent exclusive of procedures:  0 minutes.         CONSULTS:  IP CONSULT TO ORTHOPEDIC SURGERY  IP CONSULT TO HOSPITALIST    PROCEDURES:  Unless otherwise noted below, none     Procedures      FINAL IMPRESSION      1. Fall, initial encounter    2. Closed fracture of distal end of left fibula, unspecified fracture morphology, initial encounter          DISPOSITION/PLAN   DISPOSITION Admitted 12/16/2024 06:20:21 PM      PATIENT REFERRED TO:  No follow-up provider specified.    DISCHARGE MEDICATIONS:  New Prescriptions    No medications on file         (Please note that portions of this note were completed with a voice recognition program.  Efforts were made to edit the dictations but occasionally words are mis-transcribed.)    Chris Murillo DO (electronically signed)  Emergency Attending Physician / Physician Assistant / Nurse Practitioner              Chris Murillo DO  12/16/24 1224

## 2024-12-17 ENCOUNTER — ANESTHESIA EVENT (OUTPATIENT)
Facility: HOSPITAL | Age: 69
DRG: 493 | End: 2024-12-17
Payer: MEDICARE

## 2024-12-17 ENCOUNTER — APPOINTMENT (OUTPATIENT)
Facility: HOSPITAL | Age: 69
DRG: 493 | End: 2024-12-17
Payer: MEDICARE

## 2024-12-17 ENCOUNTER — ANESTHESIA (OUTPATIENT)
Facility: HOSPITAL | Age: 69
DRG: 493 | End: 2024-12-17
Payer: MEDICARE

## 2024-12-17 LAB
COMMENT:: NORMAL
EST. AVERAGE GLUCOSE BLD GHB EST-MCNC: 192 MG/DL
GLUCOSE BLD STRIP.AUTO-MCNC: 181 MG/DL (ref 65–117)
GLUCOSE BLD STRIP.AUTO-MCNC: 181 MG/DL (ref 65–117)
GLUCOSE BLD STRIP.AUTO-MCNC: 208 MG/DL (ref 65–117)
HBA1C MFR BLD: 8.3 % (ref 4–5.6)
SERVICE CMNT-IMP: ABNORMAL
SPECIMEN HOLD: NORMAL

## 2024-12-17 PROCEDURE — 3600000003 HC SURGERY LEVEL 3 BASE: Performed by: STUDENT IN AN ORGANIZED HEALTH CARE EDUCATION/TRAINING PROGRAM

## 2024-12-17 PROCEDURE — 7100000000 HC PACU RECOVERY - FIRST 15 MIN: Performed by: STUDENT IN AN ORGANIZED HEALTH CARE EDUCATION/TRAINING PROGRAM

## 2024-12-17 PROCEDURE — 6370000000 HC RX 637 (ALT 250 FOR IP): Performed by: STUDENT IN AN ORGANIZED HEALTH CARE EDUCATION/TRAINING PROGRAM

## 2024-12-17 PROCEDURE — 2720000010 HC SURG SUPPLY STERILE: Performed by: STUDENT IN AN ORGANIZED HEALTH CARE EDUCATION/TRAINING PROGRAM

## 2024-12-17 PROCEDURE — 6360000002 HC RX W HCPCS: Performed by: NURSE ANESTHETIST, CERTIFIED REGISTERED

## 2024-12-17 PROCEDURE — 2580000003 HC RX 258: Performed by: STUDENT IN AN ORGANIZED HEALTH CARE EDUCATION/TRAINING PROGRAM

## 2024-12-17 PROCEDURE — C1713 ANCHOR/SCREW BN/BN,TIS/BN: HCPCS | Performed by: STUDENT IN AN ORGANIZED HEALTH CARE EDUCATION/TRAINING PROGRAM

## 2024-12-17 PROCEDURE — 64445 NJX AA&/STRD SCIATIC NRV IMG: CPT | Performed by: STUDENT IN AN ORGANIZED HEALTH CARE EDUCATION/TRAINING PROGRAM

## 2024-12-17 PROCEDURE — 6370000000 HC RX 637 (ALT 250 FOR IP): Performed by: FAMILY MEDICINE

## 2024-12-17 PROCEDURE — 3700000000 HC ANESTHESIA ATTENDED CARE: Performed by: STUDENT IN AN ORGANIZED HEALTH CARE EDUCATION/TRAINING PROGRAM

## 2024-12-17 PROCEDURE — 6370000000 HC RX 637 (ALT 250 FOR IP): Performed by: NURSE PRACTITIONER

## 2024-12-17 PROCEDURE — 6360000002 HC RX W HCPCS: Performed by: STUDENT IN AN ORGANIZED HEALTH CARE EDUCATION/TRAINING PROGRAM

## 2024-12-17 PROCEDURE — 82962 GLUCOSE BLOOD TEST: CPT

## 2024-12-17 PROCEDURE — 83036 HEMOGLOBIN GLYCOSYLATED A1C: CPT

## 2024-12-17 PROCEDURE — 2709999900 HC NON-CHARGEABLE SUPPLY: Performed by: STUDENT IN AN ORGANIZED HEALTH CARE EDUCATION/TRAINING PROGRAM

## 2024-12-17 PROCEDURE — 7100000001 HC PACU RECOVERY - ADDTL 15 MIN: Performed by: STUDENT IN AN ORGANIZED HEALTH CARE EDUCATION/TRAINING PROGRAM

## 2024-12-17 PROCEDURE — 3600000013 HC SURGERY LEVEL 3 ADDTL 15MIN: Performed by: STUDENT IN AN ORGANIZED HEALTH CARE EDUCATION/TRAINING PROGRAM

## 2024-12-17 PROCEDURE — 99223 1ST HOSP IP/OBS HIGH 75: CPT | Performed by: STUDENT IN AN ORGANIZED HEALTH CARE EDUCATION/TRAINING PROGRAM

## 2024-12-17 PROCEDURE — 0QSH04Z REPOSITION LEFT TIBIA WITH INTERNAL FIXATION DEVICE, OPEN APPROACH: ICD-10-PCS | Performed by: STUDENT IN AN ORGANIZED HEALTH CARE EDUCATION/TRAINING PROGRAM

## 2024-12-17 PROCEDURE — 6370000000 HC RX 637 (ALT 250 FOR IP): Performed by: PHYSICIAN ASSISTANT

## 2024-12-17 PROCEDURE — 2580000003 HC RX 258: Performed by: NURSE ANESTHETIST, CERTIFIED REGISTERED

## 2024-12-17 PROCEDURE — 2500000003 HC RX 250 WO HCPCS: Performed by: STUDENT IN AN ORGANIZED HEALTH CARE EDUCATION/TRAINING PROGRAM

## 2024-12-17 PROCEDURE — 1100000000 HC RM PRIVATE

## 2024-12-17 PROCEDURE — 2500000003 HC RX 250 WO HCPCS: Performed by: NURSE ANESTHETIST, CERTIFIED REGISTERED

## 2024-12-17 PROCEDURE — 0QSK04Z REPOSITION LEFT FIBULA WITH INTERNAL FIXATION DEVICE, OPEN APPROACH: ICD-10-PCS | Performed by: STUDENT IN AN ORGANIZED HEALTH CARE EDUCATION/TRAINING PROGRAM

## 2024-12-17 PROCEDURE — 27814 TREATMENT OF ANKLE FRACTURE: CPT | Performed by: STUDENT IN AN ORGANIZED HEALTH CARE EDUCATION/TRAINING PROGRAM

## 2024-12-17 PROCEDURE — 3700000001 HC ADD 15 MINUTES (ANESTHESIA): Performed by: STUDENT IN AN ORGANIZED HEALTH CARE EDUCATION/TRAINING PROGRAM

## 2024-12-17 DEVICE — SCREW BNE L14MM DIA4MM CANC S STL ST CANN NONLOCKING FULL: Type: IMPLANTABLE DEVICE | Site: ANKLE | Status: FUNCTIONAL

## 2024-12-17 DEVICE — WASHER ORTH DIA7MM FOR CANN SCR: Type: IMPLANTABLE DEVICE | Site: ANKLE | Status: FUNCTIONAL

## 2024-12-17 DEVICE — SCREW CORTES 3.5MM SELF-TAPPING 18MM: Type: IMPLANTABLE DEVICE | Site: ANKLE | Status: FUNCTIONAL

## 2024-12-17 DEVICE — SCREW BNE L16MM DIA4MM CANC S STL ST CANN NONLOCKING FULL: Type: IMPLANTABLE DEVICE | Site: ANKLE | Status: FUNCTIONAL

## 2024-12-17 DEVICE — ISOLA SPINE SYSTEM HEX WRENCH 3.18MM
Type: IMPLANTABLE DEVICE | Site: ANKLE | Status: FUNCTIONAL
Brand: ISOLA

## 2024-12-17 DEVICE — SCREW BNE L12MM DIA3.5MM CORT S STL ST NONCANNULATED LOK: Type: IMPLANTABLE DEVICE | Site: ANKLE | Status: FUNCTIONAL

## 2024-12-17 DEVICE — PLATE BNE L81MM 7 H S STL 1/3 TBLR LOK COMPR W/ CLLR FOR: Type: IMPLANTABLE DEVICE | Site: ANKLE | Status: FUNCTIONAL

## 2024-12-17 DEVICE — SCREW BNE L14MM DIA3.5MM CORT S STL ST NONCANNULATED LOK: Type: IMPLANTABLE DEVICE | Site: ANKLE | Status: FUNCTIONAL

## 2024-12-17 DEVICE — SCREW BNE L10MM DIA2.7MM CORT S STL ST FULL THRD FOR SM: Type: IMPLANTABLE DEVICE | Site: ANKLE | Status: FUNCTIONAL

## 2024-12-17 RX ORDER — OXYCODONE HYDROCHLORIDE 5 MG/1
5 TABLET ORAL
Status: DISCONTINUED | OUTPATIENT
Start: 2024-12-17 | End: 2024-12-17 | Stop reason: HOSPADM

## 2024-12-17 RX ORDER — CITRIC ACID/SODIUM CITRATE 334-500MG
30 SOLUTION, ORAL ORAL ONCE
Status: COMPLETED | OUTPATIENT
Start: 2024-12-17 | End: 2024-12-17

## 2024-12-17 RX ORDER — SODIUM CHLORIDE, SODIUM LACTATE, POTASSIUM CHLORIDE, CALCIUM CHLORIDE 600; 310; 30; 20 MG/100ML; MG/100ML; MG/100ML; MG/100ML
INJECTION, SOLUTION INTRAVENOUS CONTINUOUS
Status: DISCONTINUED | OUTPATIENT
Start: 2024-12-17 | End: 2024-12-17 | Stop reason: HOSPADM

## 2024-12-17 RX ORDER — ROCURONIUM BROMIDE 10 MG/ML
INJECTION, SOLUTION INTRAVENOUS
Status: DISCONTINUED | OUTPATIENT
Start: 2024-12-17 | End: 2024-12-17 | Stop reason: SDUPTHER

## 2024-12-17 RX ORDER — ACETAMINOPHEN 500 MG
1000 TABLET ORAL ONCE
Status: COMPLETED | OUTPATIENT
Start: 2024-12-17 | End: 2024-12-17

## 2024-12-17 RX ORDER — ROPIVACAINE HYDROCHLORIDE 5 MG/ML
30 INJECTION, SOLUTION EPIDURAL; INFILTRATION; PERINEURAL ONCE
Status: DISCONTINUED | OUTPATIENT
Start: 2024-12-17 | End: 2024-12-17 | Stop reason: HOSPADM

## 2024-12-17 RX ORDER — SODIUM CHLORIDE 0.9 % (FLUSH) 0.9 %
5-40 SYRINGE (ML) INJECTION PRN
Status: DISCONTINUED | OUTPATIENT
Start: 2024-12-17 | End: 2024-12-17 | Stop reason: HOSPADM

## 2024-12-17 RX ORDER — FENTANYL CITRATE 50 UG/ML
100 INJECTION, SOLUTION INTRAMUSCULAR; INTRAVENOUS
Status: COMPLETED | OUTPATIENT
Start: 2024-12-17 | End: 2024-12-17

## 2024-12-17 RX ORDER — SODIUM CHLORIDE 0.9 % (FLUSH) 0.9 %
5-40 SYRINGE (ML) INJECTION EVERY 12 HOURS SCHEDULED
Status: DISCONTINUED | OUTPATIENT
Start: 2024-12-17 | End: 2024-12-17 | Stop reason: HOSPADM

## 2024-12-17 RX ORDER — FENTANYL CITRATE 50 UG/ML
25 INJECTION, SOLUTION INTRAMUSCULAR; INTRAVENOUS EVERY 5 MIN PRN
Status: DISCONTINUED | OUTPATIENT
Start: 2024-12-17 | End: 2024-12-17 | Stop reason: HOSPADM

## 2024-12-17 RX ORDER — SODIUM CHLORIDE 9 MG/ML
INJECTION, SOLUTION INTRAVENOUS PRN
Status: DISCONTINUED | OUTPATIENT
Start: 2024-12-17 | End: 2024-12-17 | Stop reason: HOSPADM

## 2024-12-17 RX ORDER — LIDOCAINE HYDROCHLORIDE 10 MG/ML
1 INJECTION, SOLUTION EPIDURAL; INFILTRATION; INTRACAUDAL; PERINEURAL
Status: DISCONTINUED | OUTPATIENT
Start: 2024-12-17 | End: 2024-12-17 | Stop reason: HOSPADM

## 2024-12-17 RX ORDER — NALOXONE HYDROCHLORIDE 0.4 MG/ML
INJECTION, SOLUTION INTRAMUSCULAR; INTRAVENOUS; SUBCUTANEOUS PRN
Status: DISCONTINUED | OUTPATIENT
Start: 2024-12-17 | End: 2024-12-17 | Stop reason: HOSPADM

## 2024-12-17 RX ORDER — HYDROMORPHONE HYDROCHLORIDE 1 MG/ML
0.5 INJECTION, SOLUTION INTRAMUSCULAR; INTRAVENOUS; SUBCUTANEOUS EVERY 5 MIN PRN
Status: DISCONTINUED | OUTPATIENT
Start: 2024-12-17 | End: 2024-12-17 | Stop reason: HOSPADM

## 2024-12-17 RX ORDER — DEXAMETHASONE SODIUM PHOSPHATE 4 MG/ML
INJECTION, SOLUTION INTRA-ARTICULAR; INTRALESIONAL; INTRAMUSCULAR; INTRAVENOUS; SOFT TISSUE
Status: DISCONTINUED | OUTPATIENT
Start: 2024-12-17 | End: 2024-12-17 | Stop reason: SDUPTHER

## 2024-12-17 RX ORDER — ONDANSETRON 2 MG/ML
4 INJECTION INTRAMUSCULAR; INTRAVENOUS
Status: DISCONTINUED | OUTPATIENT
Start: 2024-12-17 | End: 2024-12-17 | Stop reason: HOSPADM

## 2024-12-17 RX ORDER — ASPIRIN 325 MG
325 TABLET, DELAYED RELEASE (ENTERIC COATED) ORAL DAILY
Status: DISCONTINUED | OUTPATIENT
Start: 2024-12-18 | End: 2024-12-19 | Stop reason: HOSPADM

## 2024-12-17 RX ORDER — FENTANYL CITRATE 50 UG/ML
INJECTION, SOLUTION INTRAMUSCULAR; INTRAVENOUS
Status: DISCONTINUED | OUTPATIENT
Start: 2024-12-17 | End: 2024-12-17 | Stop reason: SDUPTHER

## 2024-12-17 RX ORDER — MIDAZOLAM HYDROCHLORIDE 2 MG/2ML
2 INJECTION, SOLUTION INTRAMUSCULAR; INTRAVENOUS PRN
Status: DISCONTINUED | OUTPATIENT
Start: 2024-12-17 | End: 2024-12-17 | Stop reason: HOSPADM

## 2024-12-17 RX ORDER — ONDANSETRON 2 MG/ML
INJECTION INTRAMUSCULAR; INTRAVENOUS
Status: DISCONTINUED | OUTPATIENT
Start: 2024-12-17 | End: 2024-12-17 | Stop reason: SDUPTHER

## 2024-12-17 RX ORDER — PHENYLEPHRINE HCL IN 0.9% NACL 0.4MG/10ML
SYRINGE (ML) INTRAVENOUS
Status: DISCONTINUED | OUTPATIENT
Start: 2024-12-17 | End: 2024-12-17 | Stop reason: SDUPTHER

## 2024-12-17 RX ORDER — PROCHLORPERAZINE EDISYLATE 5 MG/ML
5 INJECTION INTRAMUSCULAR; INTRAVENOUS
Status: DISCONTINUED | OUTPATIENT
Start: 2024-12-17 | End: 2024-12-17 | Stop reason: HOSPADM

## 2024-12-17 RX ORDER — HYDRALAZINE HYDROCHLORIDE 20 MG/ML
10 INJECTION INTRAMUSCULAR; INTRAVENOUS
Status: DISCONTINUED | OUTPATIENT
Start: 2024-12-17 | End: 2024-12-17 | Stop reason: HOSPADM

## 2024-12-17 RX ORDER — SUCCINYLCHOLINE/SOD CL,ISO/PF 200MG/10ML
SYRINGE (ML) INTRAVENOUS
Status: DISCONTINUED | OUTPATIENT
Start: 2024-12-17 | End: 2024-12-17 | Stop reason: SDUPTHER

## 2024-12-17 RX ORDER — LABETALOL HYDROCHLORIDE 5 MG/ML
10 INJECTION, SOLUTION INTRAVENOUS
Status: DISCONTINUED | OUTPATIENT
Start: 2024-12-17 | End: 2024-12-17 | Stop reason: HOSPADM

## 2024-12-17 RX ADMIN — ATORVASTATIN CALCIUM 40 MG: 40 TABLET, FILM COATED ORAL at 21:40

## 2024-12-17 RX ADMIN — FENTANYL CITRATE 50 MCG: 50 INJECTION INTRAMUSCULAR; INTRAVENOUS at 17:06

## 2024-12-17 RX ADMIN — LOSARTAN POTASSIUM 100 MG: 50 TABLET, FILM COATED ORAL at 09:19

## 2024-12-17 RX ADMIN — PANTOPRAZOLE SODIUM 40 MG: 40 TABLET, DELAYED RELEASE ORAL at 06:21

## 2024-12-17 RX ADMIN — OXYCODONE HYDROCHLORIDE 5 MG: 5 TABLET ORAL at 21:40

## 2024-12-17 RX ADMIN — PROPOFOL 150 MG: 10 INJECTION, EMULSION INTRAVENOUS at 17:06

## 2024-12-17 RX ADMIN — Medication 120 MCG: at 18:48

## 2024-12-17 RX ADMIN — CYANOCOBALAMIN TAB 500 MCG 1000 MCG: 500 TAB at 09:19

## 2024-12-17 RX ADMIN — Medication 140 MG: at 17:06

## 2024-12-17 RX ADMIN — ROCURONIUM BROMIDE 5 MG: 10 INJECTION, SOLUTION INTRAVENOUS at 17:06

## 2024-12-17 RX ADMIN — ACETAMINOPHEN 650 MG: 325 TABLET ORAL at 00:47

## 2024-12-17 RX ADMIN — PHENYLEPHRINE HYDROCHLORIDE 40 MCG/MIN: 10 INJECTION INTRAVENOUS at 17:14

## 2024-12-17 RX ADMIN — FAMOTIDINE 20 MG: 10 INJECTION, SOLUTION INTRAVENOUS at 16:57

## 2024-12-17 RX ADMIN — SODIUM CITRATE AND CITRIC ACID MONOHYDRATE 30 ML: 500; 334 SOLUTION ORAL at 16:30

## 2024-12-17 RX ADMIN — LIDOCAINE HYDROCHLORIDE 100 MG: 20 INJECTION, SOLUTION EPIDURAL; INFILTRATION; INTRACAUDAL; PERINEURAL at 17:06

## 2024-12-17 RX ADMIN — SODIUM CHLORIDE, POTASSIUM CHLORIDE, SODIUM LACTATE AND CALCIUM CHLORIDE: 600; 310; 30; 20 INJECTION, SOLUTION INTRAVENOUS at 16:06

## 2024-12-17 RX ADMIN — WATER 2000 MG: 1 INJECTION INTRAMUSCULAR; INTRAVENOUS; SUBCUTANEOUS at 17:15

## 2024-12-17 RX ADMIN — ONDANSETRON 4 MG: 2 INJECTION INTRAMUSCULAR; INTRAVENOUS at 18:36

## 2024-12-17 RX ADMIN — DEXAMETHASONE SODIUM PHOSPHATE 4 MG: 4 INJECTION, SOLUTION INTRAMUSCULAR; INTRAVENOUS at 17:15

## 2024-12-17 RX ADMIN — ACETAMINOPHEN 1000 MG: 500 TABLET ORAL at 16:30

## 2024-12-17 RX ADMIN — FLUOXETINE HYDROCHLORIDE 40 MG: 20 CAPSULE ORAL at 12:49

## 2024-12-17 RX ADMIN — FENTANYL CITRATE 50 MCG: 50 INJECTION INTRAMUSCULAR; INTRAVENOUS at 18:39

## 2024-12-17 RX ADMIN — INSULIN LISPRO 2 UNITS: 100 INJECTION, SOLUTION INTRAVENOUS; SUBCUTANEOUS at 06:22

## 2024-12-17 RX ADMIN — METOPROLOL SUCCINATE 100 MG: 50 TABLET, EXTENDED RELEASE ORAL at 09:19

## 2024-12-17 RX ADMIN — Medication 120 MCG: at 17:14

## 2024-12-17 RX ADMIN — OXYCODONE HYDROCHLORIDE 5 MG: 5 TABLET ORAL at 05:37

## 2024-12-17 RX ADMIN — INSULIN LISPRO 2 UNITS: 100 INJECTION, SOLUTION INTRAVENOUS; SUBCUTANEOUS at 23:07

## 2024-12-17 RX ADMIN — MIDAZOLAM 2 MG: 1 INJECTION INTRAMUSCULAR; INTRAVENOUS at 16:45

## 2024-12-17 RX ADMIN — ATORVASTATIN CALCIUM 40 MG: 40 TABLET, FILM COATED ORAL at 00:47

## 2024-12-17 RX ADMIN — ACETAMINOPHEN 650 MG: 325 TABLET ORAL at 06:21

## 2024-12-17 RX ADMIN — ACETAMINOPHEN 650 MG: 325 TABLET ORAL at 12:49

## 2024-12-17 RX ADMIN — FENTANYL CITRATE 50 MCG: 50 INJECTION INTRAMUSCULAR; INTRAVENOUS at 16:45

## 2024-12-17 ASSESSMENT — PAIN SCALES - GENERAL
PAINLEVEL_OUTOF10: 6
PAINLEVEL_OUTOF10: 6
PAINLEVEL_OUTOF10: 5
PAINLEVEL_OUTOF10: 3
PAINLEVEL_OUTOF10: 6
PAINLEVEL_OUTOF10: 9
PAINLEVEL_OUTOF10: 7
PAINLEVEL_OUTOF10: 4
PAINLEVEL_OUTOF10: 5
PAINLEVEL_OUTOF10: 8
PAINLEVEL_OUTOF10: 5

## 2024-12-17 ASSESSMENT — PAIN DESCRIPTION - LOCATION
LOCATION: FOOT
LOCATION: ANKLE;FOOT
LOCATION: FOOT
LOCATION: ANKLE;FOOT
LOCATION: FOOT
LOCATION: ANKLE;FOOT

## 2024-12-17 ASSESSMENT — PAIN DESCRIPTION - ORIENTATION
ORIENTATION: LEFT

## 2024-12-17 ASSESSMENT — PAIN DESCRIPTION - DESCRIPTORS
DESCRIPTORS: ACHING

## 2024-12-17 ASSESSMENT — PAIN - FUNCTIONAL ASSESSMENT
PAIN_FUNCTIONAL_ASSESSMENT: FACE, LEGS, ACTIVITY, CRY, AND CONSOLABILITY (FLACC)
PAIN_FUNCTIONAL_ASSESSMENT: NONE - DENIES PAIN

## 2024-12-17 NOTE — ACP (ADVANCE CARE PLANNING)
Advance Care Planning     Advance Care Planning Inpatient Note  [facility] Department    Today's Date: 12/17/2024  Unit: [facility] 5S1 ORTHO JOINT    Received request from HealthCare Provider.  Upon review of chart and communication with care team, patient's decision making abilities are not in question.. Patient was/were present in the room during visit.    Goals of ACP Conversation:  Discuss advance care planning documents    Health Care Decision Makers:     No healthcare decision makers have been documented.    Summary:  Documented Next of Kin, per patient report Patient advised she filled out medical power of  paperwork with her .     Advance Care Planning Documents (Patient Wishes):  None     Assessment:   responded to request for advance medical directive consult. Facilitated discussion in which patient advised she has existing medial power  paperwork in which she names her sister in law as her medical POA. Patient advised it was completed with her  and declined doing new documents at this time.     Interventions:  Patient DECLINED ACP conversation    Care Preferences Communicated:   No    Outcomes/Plan:  ACP Discussion: Refused    Electronically signed by Kostas Guzman Chaplain Resident on 12/17/2024 at 11:48 AM

## 2024-12-17 NOTE — CONSULTS
Orthopedic Surgery Consult Note:  CC: Left ankle pain    Subjective:  HPI: Dominique Pelayo is a 69 y.o. female who is currently admitted to [facility] and was transferred from short [facility] emergency department yesterday after sustaining a ground-level fall.She does live alone independently.  Patient states that she did feel dizzy and nauseous and sustained a fall.  She does not hit her head.  She complains of bilateral ankle pain to the left more severe than the right.  The right ankle has now gotten better overnight.  She was placed in a splint to her left ankle and transferred to [facility] overnight.  She does not complain of any pain to bilateral upper extremities.  Before this fall she was able to ambulate without an assistive device.  She does have a history of diabetes but is not on insulin.  Last p.o. intake was yesterday evening.  Does not take anticoagulation.  Currently denies chest pain, shortness breath, fever, nausea, vomiting, chills, sensation changes, headache, blurry vision.  Patient does report chronic right knee pain and has known osteoarthritis.    ROS: With the exception of pertinent positives and negatives, all other systems reviewed are negative.  PMH:  has a past medical history of Breast cancer (HCC) (1/11/2011), Cancer (HCC), Diabetes (HCC), GERD (gastroesophageal reflux disease), and Hypertension.  PSH:  has a past surgical history that includes heent (1965); orthopedic surgery (2004); other surgical history (1993); gyn (2001); Mastectomy; Breast surgery (1990); Breast surgery (1989); heent (2008); and other surgical history (2004).   Medications: FLUoxetine, Multiple Vitamin, atenolol, atorvastatin, cyanocobalamin, diphenhydrAMINE, dulaglutide, furosemide, irbesartan, lidocaine, nebivolol, omeprazole, pioglitazone-metFORMIN, topiramate, and vitamin D   Allergies: Enalapril maleate, Hydrochlorothiazide w-triamterene, and Ibuprofen   FH: family history includes Other in her  16 19   Temp: 97 °F (36.1 °C) 98.9 °F (37.2 °C) 98.6 °F (37 °C)    TempSrc: Tympanic Oral Oral    SpO2: 97% 97% 95%      Recent Labs     12/16/24  1702   WBC 11.8*   HGB 13.8   HCT 42.5         K 4.8   BUN 31*   CREATININE 1.30*      Imaging:  Imaging was independently reviewed and interpreted by myself:    Multiple views of the left ankle demonstrates a bimalleolar ankle fracture subluxation.  There is a transverse comminuted medial malleolus which is displaced.  There is a Diaz B fracture of the distal fibula which is displaced.  The talus is subtly shifted laterally.  No evidence of posterior malleolus fracture on lateral radiograph.    Multiple views of the right knee demonstrate moderate to severe osteoarthritic changes especially in the medial compartment with mixed osteophytes and sclerosis.  No acute fracture or dislocation identified. Small proximal fibular thickening which may suggest old healed fx.    Multiple views of the right ankle demonstrates no acute fracture or dislocation.  The tibiotalar joint is well reduced.    Assessment:  69 y.o. female with a left closed bimalleolar ankle fracture    Plan:   She is currently admitted to the medicine team in the hospital.  Recommend continued splint.  Ice and elevation.  Preoperative labs.  Had a long discussion this morning regarding treatment options for her ankle fracture.  Recommend surgical fixation as her ankle is unstable.  Discussed risk benefits and alternatives to open reduction internal fixation of her left ankle.  She would like to proceed with surgical intervention.  Antibiotics on-call to operating room.  We did discuss her chronic medical conditions including diabetes and obesity and how this contributes to her treatment plan. She understands that diabetics have increased risk for wound complications as well as slow bone healing.  She will continue to optimize her sugars and understands the postoperative rehabilitation protocol of  being nonweightbearing for at least 6 weeks after surgery. The patient understands there is a risk of infection, wound issues, postoperative pain, numbness, tingling, stiffness DVT, PE, MI, CVA and any other unforeseen events. The patient also understands there is a rehabilitative process that typically follows the surgical procedure. We talked about the possibility of not being able to alleviate all of the discomfort. Also, I explained there is no guarantee all function and strength will return. The patient understands the possiblity that implants will be utilized during this surgery. The patient also understands the generalized, associated risk of anesthetic and wishes to proceed.      8:04 AM  Malik Chadwick, DO  Orthopedic Surgery

## 2024-12-17 NOTE — ANESTHESIA PROCEDURE NOTES
Peripheral Block    Patient location during procedure: pre-op  Reason for block: post-op pain management and at surgeon's request  Start time: 12/17/2024 4:44 PM  End time: 12/17/2024 4:53 PM  Staffing  Performed: anesthesiologist   Anesthesiologist: Usha Reynaga MD  Performed by: Usha Reynaga MD  Authorized by: Usha Reynaga MD    Preanesthetic Checklist  Completed: patient identified, IV checked, site marked, risks and benefits discussed, surgical/procedural consents, equipment checked, pre-op evaluation, timeout performed, anesthesia consent given, oxygen available, monitors applied/VS acknowledged, fire risk safety assessment completed and verbalized and blood product R/B/A discussed and consented  Peripheral Block   Patient position: supine  Prep: ChloraPrep  Provider prep: mask and sterile gloves  Patient monitoring: cardiac monitor, continuous pulse ox, frequent blood pressure checks, IV access, oxygen, responsive to questions and continuous capnometry  Block type: Sciatic  Popliteal  Laterality: left  Injection technique: single-shot  Guidance: ultrasound guided  Local infiltration: ropivacaine  Infiltration strength: 0.5 %  Local infiltration: ropivacaine  Dose: 30 mL    Needle   Needle type: insulated echogenic nerve stimulator needle   Needle localization: ultrasound guidance  Needle length: 10 cm  Assessment   Injection assessment: negative aspiration for heme, no paresthesia on injection, local visualized surrounding nerve on ultrasound and no intravascular symptoms  Paresthesia pain: none  Hemodynamics: stable  Outcomes: uncomplicated and patient tolerated procedure well

## 2024-12-17 NOTE — PLAN OF CARE

## 2024-12-17 NOTE — ED NOTES
Verbal shift change report given to ALEXIS Mike (oncoming nurse) by ALEXIS Villagomez (offgoing nurse). Report included the following information ED Encounter Summary, ED SBAR, MAR, Recent Results, and Cardiac Rhythm NSR .

## 2024-12-17 NOTE — CARE COORDINATION
Care Management Initial Assessment           RUR:16%  Readmission? No  1st IM letter given? Yes   1st  letter given: No   CM met with patient/spouse at bedside to introduce self and role. She will be going to the OR today for ankle surgery.    Patient was independent with ADLs prior to admission.  Patient lives alone in a one level mobile home.    CM to follow for discharge planning after surgery.    ADLs: Independent  DME: [employer]  PCP follow up: Kathe Gerardo  Previous Home Health: None  Previous Skilled Nursing Facility: None  Previous Inpatient Rehab: None  Insurance verified: Medicare A&B primary, Marcy secondary  Pharmacy: [facility]  Emergency Contact: Marcia Persaud [phone]    CM will follow patient progress and assist as needed with SAMARA plan.      12/17/24 8873   Service Assessment   Patient Orientation Alert and Oriented   Cognition Alert   History Provided By Patient   Primary Caregiver Self   Support Systems Family Members   Patient's Healthcare Decision Maker is: Named in Scanned ACP Document   PCP Verified by CM Yes   Last Visit to PCP Within last 6 months   Prior Functional Level Independent in ADLs/IADLs   Current Functional Level Independent in ADLs/IADLs   Can patient return to prior living arrangement Yes   Ability to make needs known: Good   Family able to assist with home care needs: Yes   Would you like for me to discuss the discharge plan with any other family members/significant others, and if so, who? No   Financial Resources Medicare   Social/Functional History   Lives With Alone   Type of Home Mobile home   Home Layout One level   Home Access Stairs to enter with rails   Entrance Stairs - Number of Steps 6   Bathroom Shower/Tub Walk-in shower;Shower chair with back   Bathroom Toilet Handicap height   Bathroom Accessibility Accessible   Home Equipment Rollator;Cane   Receives Help From Family   Prior Level of Assist for ADLs Independent   Prior Level of Assist

## 2024-12-17 NOTE — ANESTHESIA PRE PROCEDURE
05:02 PM    CALCIUM 9.1 12/16/2024 05:02 PM    BILITOT 0.5 12/16/2024 05:02 PM    ALKPHOS 93 12/16/2024 05:02 PM    ALKPHOS 87 09/02/2019 04:07 AM    AST 17 12/16/2024 05:02 PM    ALT 20 12/16/2024 05:02 PM       POC Tests:   Recent Labs     12/17/24  1134   POCGLU 181*       Coags:   Lab Results   Component Value Date/Time    PROTIME 9.2 09/01/2019 01:45 PM    INR 0.9 09/01/2019 01:45 PM       HCG (If Applicable): No results found for: \"PREGTESTUR\", \"PREGSERUM\", \"HCG\", \"HCGQUANT\"     ABGs: No results found for: \"PHART\", \"PO2ART\", \"LMU0VQW\", \"NZA4YVM\", \"BEART\", \"K5VVGZPJ\"     Type & Screen (If Applicable):  No results found for: \"ABORH\", \"LABANTI\"    Drug/Infectious Status (If Applicable):  No results found for: \"HIV\", \"HEPCAB\"    COVID-19 Screening (If Applicable): No results found for: \"COVID19\"        Anesthesia Evaluation     no history of anesthetic complications:   Airway: Mallampati: II  TM distance: >3 FB   Neck ROM: full  Mouth opening: > = 3 FB   Dental: normal exam         Pulmonary:normal exam        (-) COPD and asthma                           Cardiovascular:  Exercise tolerance: good (>4 METS)  (+) hypertension:    (-)  angina and  WATKINS      Rhythm: regular  Rate: normal                 ROS comment: Pt has hx chronic dizziness/nausea (for nearly a decade) that has been previously investigated by Cardiology,with unremarkable workup, per pt.      Neuro/Psych:      (-) seizures            ROS comment: Hx CVA listed in chart, but pt denies dx GI/Hepatic/Renal:   (+) GERD: well controlled, renal disease:         ROS comment: Patient is appropriately NPO  CHENCHO  .   Endo/Other:    (+) Diabetes, malignancy/cancer.                 Abdominal:             Vascular:          Other Findings:             Anesthesia Plan      regional and general     ASA 3       Induction: rapid sequence.      Anesthetic plan and risks discussed with patient.                  Discussion with patient regarding plan for DNR. Pt

## 2024-12-17 NOTE — CONSULTS
Ortho Consult    Note placed for order compliance only. Please see Ortho Consult Note by Dr Malik Chadwick from 12/17 for plan of care

## 2024-12-17 NOTE — PROGRESS NOTES
Today's Date: 12/17/2024  Unit: [facility] ORTHO JOINT     Received request from HealthCare Provider.  Upon review of chart and communication with care team, patient's decision making abilities are not in question.. Patient was/were present in the room during visit.     Goals of ACP Conversation:  Discuss advance care planning documents     Health Care Decision Makers:                No healthcare decision makers have been documented.     Summary:  Documented Next of Kin, per patient report Patient advised she filled out medical power of  paperwork with her .      Advance Care Planning Documents (Patient Wishes):  None      Assessment:   responded to request for advance medical directive consult. Facilitated discussion in which patient advised she has existing medial power  paperwork in which she names her sister in law as her medical POA. Patient advised it was completed with her  and declined doing new documents at this time.      Interventions:  Patient DECLINED ACP conversation     Care Preferences Communicated:              No     Outcomes/Plan:  ACP Discussion: Refused     Electronically signed by Gwendolyn Gantlain Resident on 12/17/2024 at 11:48 AM

## 2024-12-17 NOTE — H&P
History and Physical    Date of Service:  12/16/2024  Primary Care Provider: Ventura Mckeon MD  Source of information: The patient and Chart review    Chief Complaint: Fall      History of Presenting Illness:   Dominique Pelayo is a 69 y.o. female past medical history of left breast cancer, hypertension, type 2 diabetes mellitus, GERD presents as a direct admission/transfer from [facility] ED to [facility] with chief complaint of bilateral ankle pain after ground-level fall.  Today at about 1030 hrs., patient had a ground-level fall.  She reportedly felt dizzy and nauseated.  When trying to walk, she reportedly fell.  She reportedly had not hit her head and had no loss of consciousness.  She is not on blood thinner.  She complained of bilateral ankle pain left greater than right.  She went to [facility] ED.  Initial recorded vital signs were temperature 97.1 °F, /64, heart rate 68, respiratory rate 18, O2 saturation 96% on room air.  Abnormal labs included BUN 31, creatinine 1.30, GFR 45, blood glucose 200, WBC 11.8, neutrophils 83%.  CT head without IV contrast showed no acute intracranial findings.  CT cervical spine without IV contrast showed multilevel degenerative spondylosis with no acute process abnormality.  Right knee 3 view x-ray showed moderate diffuse osteopenia, osteoarthrosis of knee with severe medial and moderate to severe palatal femoral compartment joint space narrowing but no acute fracture or dislocation.  X-ray left and right knee (3 views) showed acute fractures of left medial malleolus and distal fibula but no right ankle fracture demonstrated.  ED ordered Tylenol 650 mg p.o. x 1 dose.  ED consulted orthopedic surgeon.  Patient was transported [facility].  On arrival here tonight, patient is now seen for admission to the hospitalist service.       REVIEW OF SYSTEMS:  Pertinent items are noted in the History of Present Illness.     Past Medical History:  °C) (Oral)   Resp 19   SpO2 97%         PHYSICAL EXAM:   General: Patient in no acute respiratory distress.  HEENT: Normocephalic.  Atraumatic.  PERRLA EOMI.  Pupils 2 mm reactive bilateral.  Sclera anicteric.  Conjunctive clear.  No otorrhea.  No rhinorrhea.  Nares patent.  Oropharynx clear.  Tongue midline/nonedematous.   Neck: Supple, no JVD, no meningeal signs.  No carotid bruits.  Trachea midline.  Respiratory/ Chest: Clear to auscultation bilaterally   CVS: RRR, S1 S2 heard, no murmurs/rubs/gallops  GI/ Abd: Soft.  Nontender.  Nondistended.  No rebound, guarding, or rigidity.  Normoactive bowel sounds.  No auscultated abdominal bruits or palpable abdominal mass.  Musculoskeletal/Ext: Splint in place to left lower extremity.    Neuro: GCS 15.  Moves all extremities x 4.  Strength 5/5 BUE / limited exam of LLE due to immobilization splint placed.  No slurred speech.  No facial droop.  Sensation grossly intact.  No pronator drift   Psych: A&Ox4.    Cap refill: Brisk, less than 3 seconds  Vascular/ Pulses: 2+ bilateral radial pulses.  Unable to access left pedal pulse due to splint.  Integument/ Skin: Warm, dry, without rashes or lesions      Data Review:   I have independently reviewed and interpreted patient's lab and all other diagnostic data    Abnormal Labs Reviewed   CBC WITH AUTO DIFFERENTIAL - Abnormal; Notable for the following components:       Result Value    WBC 11.8 (*)     Neutrophils % 83 (*)     Neutrophils Absolute 9.7 (*)     All other components within normal limits   COMPREHENSIVE METABOLIC PANEL - Abnormal; Notable for the following components:    Glucose 200 (*)     BUN 31 (*)     Creatinine 1.30 (*)     BUN/Creatinine Ratio 24 (*)     Est, Glom Filt Rate 45 (*)     Albumin/Globulin Ratio 1.0 (*)     All other components within normal limits       [unfilled]    IMAGING:   CT HEAD WO CONTRAST   Final Result      1.   No acute intracranial findings.            Electronically signed by  650 g p.o. every 6 hours as needed  -Oxycodone 2.5 to 5 mg p.o. every 4 hours as needed for severe pain  -Morphine 2 mg IV every 4 hours as needed for severe pain    3.   Fall, initial encounter  -Place on fall precautions and neuro checks    4.  CHENCHO (acute kidney injury)  -BUN 31, creatinine 1.30, GFR 45  -Order 0.9% normal saline IV fluids at 100 mL/hr (while NPO)  -Repeat renal panel in a.m.  -Avoid nephrotoxic agents  -Order UA with microscopy    5.  Uncontrolled type 2 diabetes mellitus with hyperglycemia  -Order insulin correctional sliding scale coverage  -Order scheduled point-of-care blood glucose monitoring  -Order hemoglobin A1c level   -Consider for basal insulin coverage with Lantus    6.  Leukocytosis, unspecified  -WBC 11.8, neutrophils 83%  -Check UA  -Order chest x-ray portable  -Repeat CBC in a.m.    7.  Essential hypertension  -Resume home BP medication:  May substitute metoprolol for Bystolic which is not available on hospital formulary  May substitute losartan for irbesartan which is not available on hospital formulary but monitor creatinine level closely and consider discontinuation if creatinine levels increase    8.  GERD  -May substitute Protonix for Prilosec which is not available in hospital formulary    Abnormal labs included BUN 31, creatinine 1.30, GFR 45, blood glucose 200, WBC 11.8, neutrophils 83%  DIET: Diet NPO Exceptions are: Sips of Water with Meds   ISOLATION PRECAUTIONS: No active isolations  CODE STATUS:  DNR  Central Line:   none  DVT PROPHYLAXIS: SCDs  FUNCTIONAL STATUS PRIOR TO HOSPITALIZATION: Ambulatory and capable of self-care but relies on assistive devices (rolling walker/cane).   Ambulatory status/function: Ambulates with assistance:  Cane and Walker   EARLY MOBILITY ASSESSMENT: Recommend an assessment from physical therapy and/or occupational therapy  ANTICIPATED DISCHARGE: Greater than 48 hours.  ANTICIPATED DISPOSITION: IPR  EMERGENCY CONTACT/SURROGATE DECISION

## 2024-12-18 LAB
ALBUMIN SERPL-MCNC: 3.1 G/DL (ref 3.5–5)
ALBUMIN/GLOB SERPL: 1 (ref 1.1–2.2)
ALP SERPL-CCNC: 80 U/L (ref 45–117)
ALT SERPL-CCNC: 14 U/L (ref 12–78)
ANION GAP SERPL CALC-SCNC: 8 MMOL/L (ref 2–12)
AST SERPL-CCNC: 19 U/L (ref 15–37)
BASOPHILS # BLD: 0 K/UL (ref 0–0.1)
BASOPHILS NFR BLD: 0 % (ref 0–1)
BILIRUB SERPL-MCNC: 0.6 MG/DL (ref 0.2–1)
BUN SERPL-MCNC: 29 MG/DL (ref 6–20)
BUN/CREAT SERPL: 27 (ref 12–20)
CALCIUM SERPL-MCNC: 8.1 MG/DL (ref 8.5–10.1)
CHLORIDE SERPL-SCNC: 105 MMOL/L (ref 97–108)
CO2 SERPL-SCNC: 24 MMOL/L (ref 21–32)
CREAT SERPL-MCNC: 1.08 MG/DL (ref 0.55–1.02)
DIFFERENTIAL METHOD BLD: ABNORMAL
EOSINOPHIL # BLD: 0 K/UL (ref 0–0.4)
EOSINOPHIL NFR BLD: 0 % (ref 0–7)
ERYTHROCYTE [DISTWIDTH] IN BLOOD BY AUTOMATED COUNT: 13.4 % (ref 11.5–14.5)
GLOBULIN SER CALC-MCNC: 3 G/DL (ref 2–4)
GLUCOSE BLD STRIP.AUTO-MCNC: 172 MG/DL (ref 65–117)
GLUCOSE BLD STRIP.AUTO-MCNC: 214 MG/DL (ref 65–117)
GLUCOSE BLD STRIP.AUTO-MCNC: 258 MG/DL (ref 65–117)
GLUCOSE BLD STRIP.AUTO-MCNC: 269 MG/DL (ref 65–117)
GLUCOSE SERPL-MCNC: 269 MG/DL (ref 65–100)
HCT VFR BLD AUTO: 33.6 % (ref 35–47)
HGB BLD-MCNC: 11 G/DL (ref 11.5–16)
IMM GRANULOCYTES # BLD AUTO: 0 K/UL (ref 0–0.04)
IMM GRANULOCYTES NFR BLD AUTO: 0 % (ref 0–0.5)
LYMPHOCYTES # BLD: 1.2 K/UL (ref 0.8–3.5)
LYMPHOCYTES NFR BLD: 13 % (ref 12–49)
MCH RBC QN AUTO: 30 PG (ref 26–34)
MCHC RBC AUTO-ENTMCNC: 32.7 G/DL (ref 30–36.5)
MCV RBC AUTO: 91.6 FL (ref 80–99)
MONOCYTES # BLD: 0.7 K/UL (ref 0–1)
MONOCYTES NFR BLD: 8 % (ref 5–13)
NEUTS SEG # BLD: 7.8 K/UL (ref 1.8–8)
NEUTS SEG NFR BLD: 79 % (ref 32–75)
NRBC # BLD: 0 K/UL (ref 0–0.01)
NRBC BLD-RTO: 0 PER 100 WBC
PLATELET # BLD AUTO: 180 K/UL (ref 150–400)
PMV BLD AUTO: 9.2 FL (ref 8.9–12.9)
POTASSIUM SERPL-SCNC: 4.2 MMOL/L (ref 3.5–5.1)
PROT SERPL-MCNC: 6.1 G/DL (ref 6.4–8.2)
RBC # BLD AUTO: 3.67 M/UL (ref 3.8–5.2)
SERVICE CMNT-IMP: ABNORMAL
SODIUM SERPL-SCNC: 137 MMOL/L (ref 136–145)
WBC # BLD AUTO: 9.8 K/UL (ref 3.6–11)

## 2024-12-18 PROCEDURE — 6370000000 HC RX 637 (ALT 250 FOR IP): Performed by: STUDENT IN AN ORGANIZED HEALTH CARE EDUCATION/TRAINING PROGRAM

## 2024-12-18 PROCEDURE — 82962 GLUCOSE BLOOD TEST: CPT

## 2024-12-18 PROCEDURE — 6360000002 HC RX W HCPCS: Performed by: STUDENT IN AN ORGANIZED HEALTH CARE EDUCATION/TRAINING PROGRAM

## 2024-12-18 PROCEDURE — 80053 COMPREHEN METABOLIC PANEL: CPT

## 2024-12-18 PROCEDURE — 1100000000 HC RM PRIVATE

## 2024-12-18 PROCEDURE — 97530 THERAPEUTIC ACTIVITIES: CPT | Performed by: PHYSICAL THERAPIST

## 2024-12-18 PROCEDURE — 2500000003 HC RX 250 WO HCPCS: Performed by: STUDENT IN AN ORGANIZED HEALTH CARE EDUCATION/TRAINING PROGRAM

## 2024-12-18 PROCEDURE — 99024 POSTOP FOLLOW-UP VISIT: CPT | Performed by: PHYSICIAN ASSISTANT

## 2024-12-18 PROCEDURE — 85025 COMPLETE CBC W/AUTO DIFF WBC: CPT

## 2024-12-18 PROCEDURE — 97161 PT EVAL LOW COMPLEX 20 MIN: CPT | Performed by: PHYSICAL THERAPIST

## 2024-12-18 RX ADMIN — WATER 2000 MG: 1 INJECTION INTRAMUSCULAR; INTRAVENOUS; SUBCUTANEOUS at 03:45

## 2024-12-18 RX ADMIN — OXYCODONE HYDROCHLORIDE 5 MG: 5 TABLET ORAL at 12:15

## 2024-12-18 RX ADMIN — INSULIN LISPRO 2 UNITS: 100 INJECTION, SOLUTION INTRAVENOUS; SUBCUTANEOUS at 12:02

## 2024-12-18 RX ADMIN — WATER 2000 MG: 1 INJECTION INTRAMUSCULAR; INTRAVENOUS; SUBCUTANEOUS at 09:44

## 2024-12-18 RX ADMIN — INSULIN LISPRO 4 UNITS: 100 INJECTION, SOLUTION INTRAVENOUS; SUBCUTANEOUS at 16:47

## 2024-12-18 RX ADMIN — LOSARTAN POTASSIUM 100 MG: 50 TABLET, FILM COATED ORAL at 08:41

## 2024-12-18 RX ADMIN — ACETAMINOPHEN 650 MG: 325 TABLET ORAL at 06:58

## 2024-12-18 RX ADMIN — OXYCODONE HYDROCHLORIDE 5 MG: 5 TABLET ORAL at 21:30

## 2024-12-18 RX ADMIN — ASPIRIN 325 MG: 325 TABLET, COATED ORAL at 08:41

## 2024-12-18 RX ADMIN — INSULIN LISPRO 4 UNITS: 100 INJECTION, SOLUTION INTRAVENOUS; SUBCUTANEOUS at 21:29

## 2024-12-18 RX ADMIN — FLUOXETINE HYDROCHLORIDE 40 MG: 20 CAPSULE ORAL at 08:41

## 2024-12-18 RX ADMIN — OXYCODONE HYDROCHLORIDE 5 MG: 5 TABLET ORAL at 16:43

## 2024-12-18 RX ADMIN — ACETAMINOPHEN 650 MG: 325 TABLET ORAL at 11:48

## 2024-12-18 RX ADMIN — ATORVASTATIN CALCIUM 40 MG: 40 TABLET, FILM COATED ORAL at 21:15

## 2024-12-18 RX ADMIN — OXYCODONE HYDROCHLORIDE 5 MG: 5 TABLET ORAL at 08:15

## 2024-12-18 RX ADMIN — Medication 1 TABLET: at 09:44

## 2024-12-18 RX ADMIN — METOPROLOL SUCCINATE 100 MG: 50 TABLET, EXTENDED RELEASE ORAL at 21:17

## 2024-12-18 RX ADMIN — PANTOPRAZOLE SODIUM 40 MG: 40 TABLET, DELAYED RELEASE ORAL at 06:58

## 2024-12-18 RX ADMIN — CYANOCOBALAMIN TAB 500 MCG 1000 MCG: 500 TAB at 08:41

## 2024-12-18 RX ADMIN — ACETAMINOPHEN 650 MG: 325 TABLET ORAL at 18:51

## 2024-12-18 ASSESSMENT — PAIN DESCRIPTION - ORIENTATION
ORIENTATION: LEFT

## 2024-12-18 ASSESSMENT — PAIN SCALES - GENERAL
PAINLEVEL_OUTOF10: 10
PAINLEVEL_OUTOF10: 5
PAINLEVEL_OUTOF10: 10
PAINLEVEL_OUTOF10: 5
PAINLEVEL_OUTOF10: 6
PAINLEVEL_OUTOF10: 10
PAINLEVEL_OUTOF10: 7
PAINLEVEL_OUTOF10: 8

## 2024-12-18 ASSESSMENT — PAIN DESCRIPTION - LOCATION
LOCATION: LEG
LOCATION: FOOT
LOCATION: ANKLE;FOOT
LOCATION: ANKLE;FOOT

## 2024-12-18 ASSESSMENT — PAIN DESCRIPTION - DESCRIPTORS
DESCRIPTORS: ACHING
DESCRIPTORS: ACHING;JABBING
DESCRIPTORS: ACHING

## 2024-12-18 ASSESSMENT — PAIN SCALES - WONG BAKER
WONGBAKER_NUMERICALRESPONSE: NO HURT

## 2024-12-18 NOTE — PROGRESS NOTES
History and Physical    Date of Service:  12/17/2024  Primary Care Provider: Kathe Wyatt MD  Source of information: The patient and Chart review    Chief Complaint: Fall      History of Presenting Illness:   Dominique Pelayo is a 69 y.o. female past medical history of left breast cancer, hypertension, type 2 diabetes mellitus, GERD presents as a direct admission/transfer from [facility] ED to [facility] with chief complaint of bilateral ankle pain after ground-level fall.  Today at about 1030 hrs., patient had a ground-level fall.  She reportedly felt dizzy and nauseated.  When trying to walk, she reportedly fell.  She reportedly had not hit her head and had no loss of consciousness.  She is not on blood thinner.  She complained of bilateral ankle pain left greater than right.  She went to [facility] ED.  Initial recorded vital signs were temperature 97.1 °F, /64, heart rate 68, respiratory rate 18, O2 saturation 96% on room air.  Abnormal labs included BUN 31, creatinine 1.30, GFR 45, blood glucose 200, WBC 11.8, neutrophils 83%.  CT head without IV contrast showed no acute intracranial findings.  CT cervical spine without IV contrast showed multilevel degenerative spondylosis with no acute process abnormality.  Right knee 3 view x-ray showed moderate diffuse osteopenia, osteoarthrosis of knee with severe medial and moderate to severe palatal femoral compartment joint space narrowing but no acute fracture or dislocation.  X-ray left and right knee (3 views) showed acute fractures of left medial malleolus and distal fibula but no right ankle fracture demonstrated.  ED ordered Tylenol 650 mg p.o. x 1 dose.  ED consulted orthopedic surgeon.  Patient was transported [facility].  On arrival here tonight, patient is now seen for admission to the hospitalist service.       REVIEW OF SYSTEMS:  Pertinent items are noted in the History of Present Illness.     Past  98.9 °F (37.2 °C) (Oral)   Resp 19   SpO2 97%         PHYSICAL EXAM:   General: Patient in no acute respiratory distress.  HEENT: Normocephalic.  Atraumatic.  PERRLA EOMI.  Pupils 2 mm reactive bilateral.  Sclera anicteric.  Conjunctive clear.  No otorrhea.  No rhinorrhea.  Nares patent.  Oropharynx clear.  Tongue midline/nonedematous.   Neck: Supple, no JVD, no meningeal signs.  No carotid bruits.  Trachea midline.  Respiratory/ Chest: Clear to auscultation bilaterally   CVS: RRR, S1 S2 heard, no murmurs/rubs/gallops  GI/ Abd: Soft.  Nontender.  Nondistended.  No rebound, guarding, or rigidity.  Normoactive bowel sounds.  No auscultated abdominal bruits or palpable abdominal mass.  Musculoskeletal/Ext: Splint in place to left lower extremity.    Neuro: GCS 15.  Moves all extremities x 4.  Strength 5/5 BUE / limited exam of LLE due to immobilization splint placed.  No slurred speech.  No facial droop.  Sensation grossly intact.  No pronator drift   Psych: A&Ox4.    Cap refill: Brisk, less than 3 seconds  Vascular/ Pulses: 2+ bilateral radial pulses.  Unable to access left pedal pulse due to splint.  Integument/ Skin: Warm, dry, without rashes or lesions      Data Review:   I have independently reviewed and interpreted patient's lab and all other diagnostic data    Abnormal Labs Reviewed   CBC WITH AUTO DIFFERENTIAL - Abnormal; Notable for the following components:       Result Value    WBC 11.8 (*)     Neutrophils % 83 (*)     Neutrophils Absolute 9.7 (*)     All other components within normal limits   COMPREHENSIVE METABOLIC PANEL - Abnormal; Notable for the following components:    Glucose 200 (*)     BUN 31 (*)     Creatinine 1.30 (*)     BUN/Creatinine Ratio 24 (*)     Est, Glom Filt Rate 45 (*)     Albumin/Globulin Ratio 1.0 (*)     All other components within normal limits   HEMOGLOBIN A1C - Abnormal; Notable for the following components:    Hemoglobin A1C 8.3 (*)     All other components within normal  limits   POCT GLUCOSE - Abnormal; Notable for the following components:    POC Glucose 208 (*)     All other components within normal limits   POCT GLUCOSE - Abnormal; Notable for the following components:    POC Glucose 181 (*)     All other components within normal limits   POCT GLUCOSE - Abnormal; Notable for the following components:    POC Glucose 181 (*)     All other components within normal limits       [unfilled]    IMAGING:   NC XR TECHNOLOGIST SERVICE   Final Result      CT HEAD WO CONTRAST   Final Result      1.   No acute intracranial findings.            Electronically signed by Hudson Duncan      CT CERVICAL SPINE WO CONTRAST   Final Result      1.  No acute osseous abnormality.   2. Multilevel degenerative spondylosis.          Electronically signed by Peter Harrison MD      XR ANKLE RIGHT (MIN 3 VIEWS)   Final Result   Acute fractures of left medial malleolus and distal fibula. No right   ankle fracture demonstrated.      EXAM:  XR ANKLE RIGHT (MIN 3 VIEWS), XR ANKLE LEFT (MIN 3 VIEWS), XR KNEE RIGHT   (3 VIEWS)      INDICATION:   Knee pain      COMPARISON: None.      FINDINGS: Three views of the right knee demonstrate moderate diffuse osteopenia.   No acute fracture or dislocation is demonstrated. There is osteoarthrosis of the   knee with severe medial and moderate to severe patellofemoral compartment joint   space narrowing associated with mild knee varus and prominent marginal   osteophytes. Atherosclerotic calcifications are shown. No joint effusion is   demonstrated.        IMPRESSION: Osteopenia and osteoarthrosis. No fracture demonstrated.               Electronically signed by Edu Lynn MD      XR KNEE RIGHT (3 VIEWS)   Final Result   Acute fractures of left medial malleolus and distal fibula. No right   ankle fracture demonstrated.      EXAM:  XR ANKLE RIGHT (MIN 3 VIEWS), XR ANKLE LEFT (MIN 3 VIEWS), XR KNEE RIGHT   (3 VIEWS)      INDICATION:   Knee pain      COMPARISON: None.

## 2024-12-18 NOTE — PROGRESS NOTES
Ortho Daily Progress Note      Patient: Dominique Pelayo                   MRN: [id number]  Sex: female  YOB: 1955           Age: 69 y.o.         1 Day Post-Op     Procedure(s):  LEFT ANKLE OPEN REDUCTION INTERNAL FIXATION SMALL FRAG SET  SYNTHES AWARE  REQ 1600      Subjective: Block has worn off, able to feel/move toes this morning     Vitals:    12/18/24 0805   BP: (!) 147/78   Pulse: 75   Resp: 18   Temp: 98.1 °F (36.7 °C)   SpO2: 98%        Lab Results:  Hemoglobin   Date/Time Value Ref Range Status   12/16/2024 05:02 PM 13.8 11.5 - 16.0 g/dL Final     INR   Date/Time Value Ref Range Status   09/01/2019 01:45 PM 0.9 0.9 - 1.1   Final     Comment:     A single therapeutic range for Vit K antagonists may not be optimal for all indications - see June, 2008 issue of Chest, American College of Chest Physicians Evidence-Based Clinical Practice Guidelines, 8th Edition.       Physical Exam:    GENERAL: 70yo female, alert, appears stated age, cooperative, and no distress  DRESSING: left short leg splint in place  NEUROLOGICAL: moving all toes, sensation intact   VASCULAR: brisk capillary refill in all toes      Plan:    Strict nonweightbearing left lower extremity  Keep splint clean dry intact  Ice and elevation  Multimodal pain control  Advance diet  Postoperative antibiotics x 2 doses  ASA DVT prophylaxis 325 daily  Physical therapy  Will likely need placement  Follow-up in 10 to 14 days with PHYLLIS Johnson  12/18/2024   10:58 AM

## 2024-12-18 NOTE — PROGRESS NOTES
Assumed care from ALEXIS Schneider    TRANSFER - OUT REPORT:    Verbal report given to ALEXIS Alvarado (name) on Dominique Pelayo  being transferred to [facility](unit) for routine progression of patient care       Report consisted of patient’s Situation, Background, Assessment and   Recommendations(SBAR).     Time Pre op antibiotic given:1715 Ancef  Anesthesia Stop time: 1912    Information from the following report(s) OR Summary, MAR, and Cardiac Rhythm NSR  was reviewed with the receiving nurse.    Opportunity for questions and clarification was provided.     Is the patient on 02? Yes       L/Min 3      Is the patient on a monitor? No    Is the nurse transporting with the patient? No    At transfer, are there Patient Belongings with the patient?  If Yes, please note/list:    Surgical Waiting Area notified of patient's transfer from PACU? No

## 2024-12-18 NOTE — PLAN OF CARE
Problem: Physical Therapy - Adult  Goal: By Discharge: Performs mobility at highest level of function for planned discharge setting.  See evaluation for individualized goals.  Description: FUNCTIONAL STATUS PRIOR TO ADMISSION: Patient was independent and active without use of DME.    HOME SUPPORT PRIOR TO ADMISSION: The patient lived alone with family to provide assistance.    Physical Therapy Goals  Initiated 12/18/2024  1.  Patient will move from supine to sit and sit to supine , scoot up and down, and roll side to side in bed with independence within 7 day(s).    2.  Patient will transfer from bed to chair and chair to bed with moderate assistance  using the least restrictive device within 7 day(s).  3.  Patient will perform sit to stand with moderate assistance  within 7 day(s).  4.  Patient will ambulate with moderate assistance  for 5 feet with the least restrictive device maintaining NWB LLE within 7 day(s).     Outcome: Progressing  PHYSICAL THERAPY EVALUATION    Patient: Dominique Pelayo (69 y.o. female)  Date: 12/18/2024  Primary Diagnosis: Fall (on)(from) incline, initial encounter [W10.2XXA]  Fall, initial encounter [W19.XXXA]  Closed fracture of distal end of left fibula, unspecified fracture morphology, initial encounter [S82.832A]  Procedure(s) (LRB):  LEFT ANKLE OPEN REDUCTION INTERNAL FIXATION SMALL FRAG SET  SYNTHES AWARE  REQ 1600 (Left) 1 Day Post-Op   Precautions:                  NWB LLW      ASSESSMENT :   DEFICITS/IMPAIRMENTS:   The patient is limited by decreased functional mobility, strength, activity tolerance, endurance, and an inability to stand or ambulate due to new NWB restrictions LLE.  She is in the bed on arrival and quite mobile with coming to sitting EOB, scooting around the bed, and returning to supine.  Her sitting balance is good.  She attempted standing x 1.  She is unable to clear the bed and has no upper body strength to push up into standing.  Additionally she was  Supervision  Transfers:     Transfer Training  Transfer Training: Yes  Overall Level of Assistance: Assist X2;Maximum assistance  Sit to Stand: Assist X2;Maximum assistance  Balance:               Balance  Sitting: Intact  Ambulation/Gait Training:                       Gait  Gait Training: No                                                                                                                                                                                                                                                          Barthel Index:    Barthel Index Scale  Feeding: Independent, Able to apply any necessary device. Feeds in reasonable time  Bathing: Cannot perform activity  Grooming: Washes face, alcaraz hair, brushes teeth, shaves (manages plug if electric razor)  Dressing: Cannot perform activity  Bowel Control: No accidents. Able to use enema or suppository if needed  Bladder Control: Occasional accidents or needs help with device  Toilet Transfers: Cannot perform activity  Chair/Bed Trannsfers: Cannot perform activity  Ambulation: Cannot perform activity  Stairs: Cannot perform activity  Total Barthel Index Score: 30       The Barthel ADL Index: Guidelines  1. The index should be used as a record of what a patient does, not as a record of what a patient could do.  2. The main aim is to establish degree of independence from any help, physical or verbal, however minor and for whatever reason.  3. The need for supervision renders the patient not independent.  4. A patient's performance should be established using the best available evidence. Asking the patient, friends/relatives and nurses are the usual sources, but direct observation and common sense are also important. However direct testing is not needed.  5. Usually the patient's performance over the preceding 24-48 hours is important, but occasionally longer periods will be relevant.  6. Middle categories imply that the patient supplies over 50  per cent of the effort.  7. Use of aids to be independent is allowed.    Score Interpretation (from [place] 1997)    Independent   60-79 Minimally independent   40-59 Partially dependent   20-39 Very dependent   <20 Totally dependent     -Joaquina Christianson, Barthel, D.W. (1965). Functional evaluation: the Barthel Index. Md State Med J (142.  -SARAH Hubbard, CLAUDIA Castillo (1997). The Barthel activities of daily living index: self-reporting versus actual performance in the old (> or = 75 years). Journal of American Geriatric Society 45(7), 832-836.   -MANN Montenegro, FERNANDA Osborne., Kj, JPERCY., Caleb, A.ODETTE. (1999). Measuring the change in disability after inpatient rehabilitation; comparison of the responsiveness of the Barthel Index and Functional [place] Measure. Journal of Neurology, Neurosurgery, and Psychiatry, 66(4), 480-484.  -Bob Rae, N.J.A, JOHNY Larry, & Elizabeth M.A. (2004) Assessment of post-stroke quality of life in cost-effectiveness studies: The usefulness of the Barthel Index and the EuroQoL-5D. Quality of Life Research, 13, 427-43                                                                                                                                                                                                                                   Activity Tolerance:   requires rest breaks    After treatment:   Patient left in no apparent distress in bed, Call bell within reach, Side rails x3, and Heels elevated for pressure relief    COMMUNICATION/EDUCATION:   The patient's plan of care was discussed with: occupational therapist and registered nurse    Patient Education  Education Given To: Patient  Education Provided: Role of Therapy;Plan of Care    Thank you for this referral.  Cee Dela Cruz, PT  Minutes: 31      Physical Therapy Evaluation Charge Determination   History Examination Presentation Decision-Making   LOW Complexity : Zero comorbidities / personal

## 2024-12-18 NOTE — PROGRESS NOTES
Order acknowledged and chart reviewed.  Pt was still tired after working with PT and was in agreement to working with OT tomorrow. Per PT, pt was not able to adhere to L NWB and may need to perform a lateral transfer to a drop arm recliner chair.  Will follow-up tomorrow for OT eval.

## 2024-12-18 NOTE — ANESTHESIA POSTPROCEDURE EVALUATION
Department of Anesthesiology  Postprocedure Note    Patient: Dominique Pelayo  MRN: [id number]  YOB: 1955  Date of evaluation: 12/17/2024    Procedure Summary       Date: 12/17/24 Room / Location: [facility] MAIN OR [address] [place] [address] MAIN OR    Anesthesia Start: 1658 Anesthesia Stop: 1914    Procedure: LEFT ANKLE OPEN REDUCTION INTERNAL FIXATION SMALL FRAG SET  SYNTHES AWARE  REQ 1600 (Left: Ankle) Diagnosis:       Closed bimalleolar fracture of left ankle, initial encounter      (Closed bimalleolar fracture of left ankle, initial encounter [S82.842A])    Providers: Malik Chadwick DO Responsible Provider: Pavan Richter DO    Anesthesia Type: General, Regional ASA Status: 3            Anesthesia Type: General, Regional    Lara Phase I: Lara Score: 8    Lara Phase II:      Anesthesia Post Evaluation    Patient location during evaluation: bedside  Patient participation: complete - patient participated  Level of consciousness: awake  Pain score: 0  Airway patency: patent  Nausea & Vomiting: no nausea and no vomiting  Cardiovascular status: blood pressure returned to baseline  Respiratory status: acceptable  Hydration status: euvolemic  Comments: BP (!) 147/70   Pulse 77   Temp 99 °F (37.2 °C) (Axillary)   Resp 21   Ht 1.524 m (5')   Wt 92.1 kg (203 lb)   SpO2 100%   BMI 39.65 kg/m²     Pain management: adequate        No notable events documented.

## 2024-12-18 NOTE — CARE COORDINATION
Transition of Care Plan:    RUR: 15%  Prior Level of Functioning: Independent  Disposition: SNF rehab.  [facility]errals [facility] to [facility](2nd), [facility](3[facility] via [facility].  No auth needed.    3 night stay needed, admitted 12/16  AGUSTÍN: 12/19  If SNF or IPR: Date FOC offered: 12/18  Date FOC received: 12/18  Accepting facility:   Date authorization started with reference number:   Date authorization received and expires:   Follow up appointments: PCP/Specialist  DME needed: No  Transportation at discharge: BLS  IM/IMM Medicare/ letter given: 12/18  Is patient a  and connected with [facility]?    If yes, was  transfer form completed and [facility] notified?   Caregiver Contact: Marcia Persaud [phone]  Discharge Caregiver contacted prior to discharge?   Care Conference needed? No  Barriers to discharge:  Medical stability    Nelly Tang RN/CRM  [phone]

## 2024-12-18 NOTE — OP NOTE
Orthopedic Surgery Operative Note:    NAME: Dominique Pelayo  YOB: 1955  MEDICAL RECORD NUMBER: [id number]  CONTACT SERIAL NUMBER: [id number]  PRIMARY CARE PHYSICIAN: Kathe Wyatt MD    PROCEDURE DATE: December 17, 2024  PREOPERATIVE DIAGNOSIS:  Closed bimalleolar fracture of left ankle, initial encounter [S82.843T]  POSTOPERATIVE DIAGNOSIS: Closed bimalleolar fracture of left ankle, initial encounter  PROCEDURE(S): Open reduction internal fixation of left bimalleolar ankle fracture (19468)    ATTENDING:  Surgeons and Role:     * Malik Chadwick, DO - Primary    ASSISTANTS:  Circulator: Bhumi Marin RN  Surgical Assistant: Rima Alarcon  Radiology Technologist: Jensen Faustin RT  Relief Scrub: Pineda Colon RN  Scrub Person First: Marcia Norris    ANESTHESIA: General     COMPLICATIONS:  None    SPECIMENS:  None    EBL:  15 ml    TOURNIQUET TIME: 90 minutes    DRAINS: None    IMPLANTS:    Implant Name Type Inv. Item Serial No.  Lot No. LRB No. Used Action   SCREW BNE L10MM DIA2.7MM MORRIS S STL ST FULL THRD FOR SM - SNA  SCREW BNE L10MM DIA2.7MM MORRIS S STL ST FULL THRD FOR SM NA [employer]PU[employer]-WD NA Left 1 Implanted   SCREW BNE L14MM DIA3.5MM MORRIS S STL ST NONCANNULATED ALIA - SNA  SCREW BNE L14MM DIA3.5MM MORRIS S STL ST NONCANNULATED ALIA NA DEPUY [employer]NTH[employer]-WD NA Left 2 Implanted   SCREW BNE L12MM DIA3.5MM MORRIS S STL ST NONCANNULATED ALIA - SNA  SCREW BNE L12MM DIA3.5MM MORRIS S STL ST NONCANNULATED ALIA NA DEPUY SYNTHES [employer]-WD NA Left 2 Implanted   SCREW HINSON 3.5MM SELF-TAPPING 18MM - SNA  SCREW HINSON 3.5MM SELF-TAPPING 18MM NA DEPUY SYNTHES USA-WD NA Left 1 Implanted   SCREW BNE L14MM DIA4MM CANC S STL ST NKECHI NONLOCKING FULL - SNA  SCREW BNE L14MM DIA4MM CANC S STL ST NKECHI NONLOCKING FULL NA DEPUY SYNTHES USA-WD NA Left 1 Implanted   SCREW BNE L16MM DIA4MM CANC S STL ST NKECHI NONLOCKING FULL - SNA  SCREW BNE L16MM DIA4MM CANC S STL ST NKECHI  NONLOCKING FULL NA DEPUY SYNTHES [employer]-WD NA Left 1 Implanted   PLATE BNE L81MM 7 H S STL 1/3 TBLR ALIA COMPR W/ CLLR FOR - SNA  PLATE BNE L81MM 7 H S STL 1/3 TBLR ALIA COMPR W/ CLLR FOR NA DE[employer]-WD NA Left 1 Implanted   SCREW BNE L40MM DIA3.5MM NKECHI PARTIALLY THRD HD PRO-SCOOTER - SNA  SCREW BNE L40MM DIA3.5MM NKECHI PARTIALLY THRD HD PRO-SCOOTER NA DEPUY [employer]NTH[employer]-WD NA Left 1 Implanted   WASHER ORTH DIA7MM FOR NKECHI SCR - SNA  WASHER ORTH DIA7MM FOR NKECHI SCR NA DEPUY SYNTHES [employer]-WD NA Left 1 Implanted       INDICATIONS:   The patient is a 69 y.o. female with with a left closed bimalleolar ankle fracture subluxation.  The risks, benefits, and alternatives to surgical intervention were discussed in detail. The patient consented and elected to proceed with open reduction internal fixation of left ankle.     PROCEDURE:   The patient was brought into the operating room and placed in the supine position, all bony prominences padded. The operative extremity was then prepped and draped in standard sterile fashion. A surgical time-out was performed confirming the patient's identity, operative site, procedure to be performed, and administration of pre-operative antibiotics. The operative extremity was exsanguinated with an esmarch and a tourniquet was inflated to 300 mmHg. A lateral incision was made overlying the fibula and dissection was carried down to bone elevating periosteal flaps to expose the fracture without de vitalizing the soft tissue structures. An elevator was used to gain exposure.  The superficial peroneal nerve was protected and retracted out of the way.  The fracture was visualized which was a short oblique fracture.  This was cleaned and irrigated with NS.  It was reduced with a lobster clamp followed by K wire.  I initially tried to lag the fracture with a 2.7 screw however there was some additional comminution in the posterior aspect of the bone and my screw did not have good purchase so this was  was followed by 3.0 Vicryl for the subcuticular layer.  3.0 nylon was used for the skin in horizontal mattress fashion.  The tourniquet was deflated.  The incision was dressed with Adaptic, 4 x 4's, ABD and a well-padded and well molded posterior slab and stirrup splint.  The instrument count was correct.  The patient was awoken from anesthesia and transported to the PACU in stable condition.     Malik Chadwick,   Orthopedic Surgery

## 2024-12-18 NOTE — PROGRESS NOTES
Hospitalist Progress Note  WOODY Zamora NP  Answering service: [phone] OR [phone] from in house phone        Date of Service:  2024  NAME:  Dominique Pelayo  :  1955  MRN:  [id number]      Admission Summary:     Dominique Pelayo is a 69 y.o. female past medical history of left breast cancer, hypertension, type 2 diabetes mellitus, GERD presents as a direct admission/transfer from [facility] ED to [facility] with chief complaint of bilateral ankle pain after ground-level fall.  Today at about 1030 hrs., patient had a ground-level fall.  She reportedly felt dizzy and nauseated.  When trying to walk, she reportedly fell.  She reportedly had not hit her head and had no loss of consciousness.  She is not on blood thinner.  She complained of bilateral ankle pain left greater than right.  She went to [facility] ED.  Initial recorded vital signs were temperature 97.1 °F, /64, heart rate 68, respiratory rate 18, O2 saturation 96% on room air.  Abnormal labs included BUN 31, creatinine 1.30, GFR 45, blood glucose 200, WBC 11.8, neutrophils 83%.  CT head without IV contrast showed no acute intracranial findings.  CT cervical spine without IV contrast showed multilevel degenerative spondylosis with no acute process abnormality.  Right knee 3 view x-ray showed moderate diffuse osteopenia, osteoarthrosis of knee with severe medial and moderate to severe palatal femoral compartment joint space narrowing but no acute fracture or dislocation.  X-ray left and right knee (3 views) showed acute fractures of left medial malleolus and distal fibula but no right ankle fracture demonstrated.  ED ordered Tylenol 650 mg p.o. x 1 dose.  ED consulted orthopedic surgeon.  Patient was transported [facility].  On arrival here tonight, patient is now seen for admission to the hospitalist service.      Interval history / Subjective:     Patient doing well today. Family at bedside. Will need SNF at discharge. Preferences chosen. Hopefully can go tomorrow, does not require insurance authorization.     Assessment & Plan:         Bimalleolar ankle fracture, left, closed, initial encounter  -Admitted/transferred to Ortho floor  -s/p surgery on 12/17/2024  - will need SNF at discharge, strict NWB on left lower extremity x 4-6 weeks  -Keep short leg splint in place for immobilization  -Order stirrups per ortho recommendations     Acute bilateral ankle pain  -no right ankle fracture  -acute left ankle fracture  -Tylenol 650 g p.o. every 6 hours as needed  -Oxycodone 2.5 to 5 mg p.o. every 4 hours as needed for severe pain  -Morphine 2 mg IV every 4 hours as needed for severe pain      Fall, initial encounter  - following syncopal episode - patient feels possibly related to blood sugar   -Place on fall precautions and neuro checks     CHENCHO (acute kidney injury), improving  -BUN 31, creatinine 1.30---> 1.08, GFR 45  -Order 0.9% normal saline IV fluids at 100 mL/hr (discontinue)  -Repeat renal panel in a.m.  -Avoid nephrotoxic agents  -Order UA with microscopy     Uncontrolled type 2 diabetes mellitus with hyperglycemia  -Order insulin correctional sliding scale coverage  -Order scheduled point-of-care blood glucose monitoring  -Order hemoglobin A1c level   -Consider for basal insulin coverage with Lantus      Leukocytosis, unspecified  -WBC 11.8, neutrophils 83%  -Check UA  -Order chest x-ray portable  -Repeat CBC in a.m.     Essential hypertension  -Resume home BP medication:  May substitute metoprolol for Bystolic which is not available on hospital formulary  May substitute losartan for irbesartan which is not available on hospital formulary but monitor creatinine level closely and consider discontinuation if creatinine levels increase     GERD  -May substitute Protonix for Prilosec which is not available in hospital  Supplied)  3 mg SubCUTAneous Weekly    aspirin EC tablet 325 mg  325 mg Oral Daily    acetaminophen (TYLENOL) tablet 650 mg  650 mg Oral Q6H    oxyCODONE (ROXICODONE) immediate release tablet 2.5 mg  2.5 mg Oral Q4H PRN    Or    oxyCODONE (ROXICODONE) immediate release tablet 5 mg  5 mg Oral Q4H PRN    morphine (PF) injection 2 mg  2 mg IntraVENous Q4H PRN    atorvastatin (LIPITOR) tablet 40 mg  40 mg Oral Nightly    vitamin B-12 (CYANOCOBALAMIN) tablet 1,000 mcg  1,000 mcg Oral Daily    glucose chewable tablet 16 g  4 tablet Oral PRN    dextrose bolus 10% 125 mL  125 mL IntraVENous PRN    Or    dextrose bolus 10% 250 mL  250 mL IntraVENous PRN    glucagon injection 1 mg  1 mg SubCUTAneous PRN    dextrose 10 % infusion   IntraVENous Continuous PRN    insulin lispro (HUMALOG,ADMELOG) injection vial 0-8 Units  0-8 Units SubCUTAneous 4x Daily AC & HS    losartan (COZAAR) tablet 100 mg  100 mg Oral Daily    metoprolol succinate (TOPROL XL) extended release tablet 100 mg  100 mg Oral Daily    pantoprazole (PROTONIX) tablet 40 mg  40 mg Oral QAM AC     ______________________________________________________________________  EXPECTED LENGTH OF STAY: Unable to retrieve estimated LOS  ACTUAL LENGTH OF STAY:          2                 Lanny Burt APRN - NP

## 2024-12-18 NOTE — PROGRESS NOTES
Orthopedic Surgery Progress Note:    Subjective:  Patient seen and examined this AM. Discussed post operative plans in detail. Pain under control, block still in effect. Denies chest pain, shortness breath, nausea/vomiting.    Objective:  Vitals:    12/17/24 1437 12/17/24 1636 12/17/24 1654 12/17/24 1912   BP: 134/60 (!) 144/63 (!) 146/63 (!) 147/66   Pulse: 72 73 75 99   Resp: 16 16 16 22   Temp: 98.8 °F (37.1 °C)   99 °F (37.2 °C)   TempSrc: Oral   Axillary   SpO2: 96% 95% 96% 98%   Weight:  92.1 kg (203 lb)     Height:  1.524 m (5')       Recent Labs     12/16/24  1702   WBC 11.8*   HGB 13.8   HCT 42.5         K 4.8   BUN 31*   CREATININE 1.30*      Musculoskeletal:   LLE  Dressing splint c/d/i  Block still in effect  Brisk capillary refill in all toes  Compartments soft and compressible    Assessment:   69 y.o. female with a left closed bimalleolar ankle fracture   S/p open reduction internal fixation of left bimalleolar ankle fracture 12/17    Plan:  Strict nonweightbearing left lower extremity  Keep splint clean dry intact  Ice and elevation  Multimodal pain control  Advance diet  Postoperative antibiotics x 2 doses  ASA DVT prophylaxis 325 daily  Physical therapy  Will likely need placement  Follow-up in 10 to 14 days with Dr. Gracie Chadwick, DO  Orthopedic Surgery

## 2024-12-19 VITALS
WEIGHT: 203 LBS | RESPIRATION RATE: 18 BRPM | HEART RATE: 75 BPM | TEMPERATURE: 98.4 F | DIASTOLIC BLOOD PRESSURE: 73 MMHG | OXYGEN SATURATION: 95 % | BODY MASS INDEX: 39.85 KG/M2 | SYSTOLIC BLOOD PRESSURE: 147 MMHG | HEIGHT: 60 IN

## 2024-12-19 LAB
GLUCOSE BLD STRIP.AUTO-MCNC: 213 MG/DL (ref 65–117)
GLUCOSE BLD STRIP.AUTO-MCNC: 229 MG/DL (ref 65–117)
SERVICE CMNT-IMP: ABNORMAL
SERVICE CMNT-IMP: ABNORMAL

## 2024-12-19 PROCEDURE — 6360000002 HC RX W HCPCS: Performed by: STUDENT IN AN ORGANIZED HEALTH CARE EDUCATION/TRAINING PROGRAM

## 2024-12-19 PROCEDURE — 6370000000 HC RX 637 (ALT 250 FOR IP): Performed by: STUDENT IN AN ORGANIZED HEALTH CARE EDUCATION/TRAINING PROGRAM

## 2024-12-19 PROCEDURE — 94760 N-INVAS EAR/PLS OXIMETRY 1: CPT

## 2024-12-19 PROCEDURE — 82962 GLUCOSE BLOOD TEST: CPT

## 2024-12-19 PROCEDURE — 6370000000 HC RX 637 (ALT 250 FOR IP): Performed by: NURSE PRACTITIONER

## 2024-12-19 PROCEDURE — 2700000000 HC OXYGEN THERAPY PER DAY

## 2024-12-19 RX ORDER — HYDROCODONE BITARTRATE AND ACETAMINOPHEN 5; 325 MG/1; MG/1
1 TABLET ORAL EVERY 6 HOURS PRN
Qty: 28 TABLET | Refills: 0 | Status: SHIPPED | OUTPATIENT
Start: 2024-12-19 | End: 2024-12-19

## 2024-12-19 RX ORDER — ASPIRIN 325 MG
325 TABLET, DELAYED RELEASE (ENTERIC COATED) ORAL DAILY
Qty: 30 TABLET | Refills: 3 | Status: SHIPPED | OUTPATIENT
Start: 2024-12-20

## 2024-12-19 RX ORDER — FEXOFENADINE HCL 180 MG/1
180 TABLET ORAL DAILY
COMMUNITY

## 2024-12-19 RX ORDER — HYDROCODONE BITARTRATE AND ACETAMINOPHEN 5; 325 MG/1; MG/1
1 TABLET ORAL EVERY 6 HOURS PRN
Qty: 28 TABLET | Refills: 0 | Status: SHIPPED | OUTPATIENT
Start: 2024-12-19 | End: 2024-12-26

## 2024-12-19 RX ADMIN — ACETAMINOPHEN 650 MG: 325 TABLET ORAL at 05:07

## 2024-12-19 RX ADMIN — ACETAMINOPHEN 650 MG: 325 TABLET ORAL at 00:27

## 2024-12-19 RX ADMIN — Medication 1 TABLET: at 09:02

## 2024-12-19 RX ADMIN — PANTOPRAZOLE SODIUM 40 MG: 40 TABLET, DELAYED RELEASE ORAL at 05:04

## 2024-12-19 RX ADMIN — MORPHINE SULFATE 2 MG: 2 INJECTION, SOLUTION INTRAMUSCULAR; INTRAVENOUS at 00:28

## 2024-12-19 RX ADMIN — ASPIRIN 325 MG: 325 TABLET, COATED ORAL at 09:02

## 2024-12-19 RX ADMIN — LOSARTAN POTASSIUM 100 MG: 50 TABLET, FILM COATED ORAL at 09:02

## 2024-12-19 RX ADMIN — ACETAMINOPHEN 650 MG: 325 TABLET ORAL at 12:02

## 2024-12-19 RX ADMIN — CYANOCOBALAMIN TAB 500 MCG 1000 MCG: 500 TAB at 09:02

## 2024-12-19 RX ADMIN — INSULIN LISPRO 2 UNITS: 100 INJECTION, SOLUTION INTRAVENOUS; SUBCUTANEOUS at 12:02

## 2024-12-19 RX ADMIN — FLUOXETINE HYDROCHLORIDE 40 MG: 20 CAPSULE ORAL at 09:00

## 2024-12-19 RX ADMIN — INSULIN LISPRO 2 UNITS: 100 INJECTION, SOLUTION INTRAVENOUS; SUBCUTANEOUS at 06:50

## 2024-12-19 RX ADMIN — OXYCODONE HYDROCHLORIDE 5 MG: 5 TABLET ORAL at 05:04

## 2024-12-19 ASSESSMENT — PAIN DESCRIPTION - ORIENTATION
ORIENTATION: LEFT
ORIENTATION: LEFT

## 2024-12-19 ASSESSMENT — PAIN DESCRIPTION - DESCRIPTORS
DESCRIPTORS: ACHING
DESCRIPTORS: ACHING

## 2024-12-19 ASSESSMENT — PAIN DESCRIPTION - LOCATION
LOCATION: ANKLE
LOCATION: ANKLE

## 2024-12-19 ASSESSMENT — PAIN SCALES - GENERAL
PAINLEVEL_OUTOF10: 7
PAINLEVEL_OUTOF10: 9
PAINLEVEL_OUTOF10: 6

## 2024-12-19 NOTE — H&P
Transition of Care Plan to SNF/Rehab:  D/c to [facility] SNF today,   NURSE CALL REPORT#:[phone], EXT: 158  ROOM C6    NURSE PLEASE PRINT MAR    [facility]    Communication to Patient/Family:  Met with patient and family and they are agreeable to the transition plan. The Plan for Transition of Care is related to the following treatment goals: [facility]    The Patient and/or patient representative was provided with a choice of provider and agrees  with the discharge plan.      Yes [x] No []    A Freedom of choice list was provided with basic dialogue that supports the patient's individualized plan of care/goals and shares the quality data associated with the providers.       Yes [x] No []    SNF/Rehab Transition:  Patient has been accepted to [facility] SNF/Rehab and meets criteria for admission.   Date of Inpatient Status Order:   Patient will transported by [facility] and expected to leave at 12:40PM.    Communication to SNF/Rehab:  Bedside RN, Diallo has been notified to update the transition plan to the facility and call report (phone number).  Discharge information has been updated on the AVS. And communicated to facility via G[employer] Messaging/All Scripts, or CC link.       Nursing Please include all hard scripts for controlled substances, med rec and dc summary, and AVS in packet.     Reviewed and confirmed with facility, [facility] of [facility] can manage the patient care needs for the following:     Peter with (X) only those applicable:  Medication:  [x]Medications are available at the facility  []IV Antibiotics    []Controlled Substance - hard copies available sent.  []Weekly Labs    Equipment:  []CPAP/BiPAP  []Wound Vacuum  []Vargas or Urinary Device  []PICC/Central Line  []Nebulizer  []Ventilator    Treatment:  []Isolation (for MRSA, VRE, etc.)  []Surgical Drain Management  []Tracheostomy Care  []Dressing Changes  []Dialysis with transportation  []PEG Care  []Oxygen  []Daily Weights for Heart Failure     Dietary:  []Any diet limitations  []Tube Feedings   []Total Parenteral Management (TPN)    Financial Resources:  []Medicaid Application Completed    []UAI Completed and copy given to pt/family  and copy given to pt/family  []A screening has previously been completed.    []Level II Completed    [] Private pay individual who will not become   financially eligible for Medicaid within 6 months from admission to a [facility].     [x] Individual refused to have screening conducted.     []Medicaid Application Completed    []The screening denied because it was determined individual did not need/did not qualify for nursing facility level of care.  [] Out of state residents seeking direct admission to a VA nursing facility.  [] Individuals who are inpatients of an out of state hospital, or in state or out of state veterans/ hospital and seek direct admission to a VA nursing facility  [] Individuals who are pateints or residents of a state owned/operated facility that is licensed by Department of Behavioral Services (DBHDS) and seek direct admission to VA nursing facility  [] A screening not required for enrollment in Medicaid Hospice services as set out in 12 VAC 30-  [] [facility] ([facility]) staff shall perform screenings of the [facility] clients.    Advanced Care Plan:  []Surrogate Decision Maker of Care  []POA  []Communicated Code Status and copy sent.    Other:

## 2024-12-19 NOTE — DISCHARGE SUMMARY
Discharge Summary       PATIENT ID: Dominique Pelayo  MRN: [id number]   YOB: 1955    DATE OF ADMISSION: 12/16/2024  3:49 PM    DATE OF DISCHARGE: 12/19/24  PRIMARY CARE PROVIDER: Kathe Wyatt MD     ATTENDING PHYSICIAN: Laverne   DISCHARGING PROVIDER: WOODY Zamora NP    To contact this individual call [phone] and ask the  to page.  If unavailable ask to be transferred the Adult Hospitalist Department.    CONSULTATIONS: IP CONSULT TO ORTHOPEDIC SURGERY  IP CONSULT TO HOSPITALIST  IP CONSULT TO SPIRITUAL SERVICES  IP CONSULT TO ORTHOPEDIC SURGERY    PROCEDURES/SURGERIES: Procedure(s):  LEFT ANKLE OPEN REDUCTION INTERNAL FIXATION SMALL FRAG SET  SYNTHES AWARE  REQ 1600     ADMITTING DIAGNOSES & HOSPITAL COURSE:     Dominique Pelayo is a 69 y.o. female past medical history of left breast cancer, hypertension, type 2 diabetes mellitus, GERD presents as a direct admission/transfer from [facility] ED to [facility] with chief complaint of bilateral ankle pain after ground-level fall.  Today at about 1030 hrs., patient had a ground-level fall.  She reportedly felt dizzy and nauseated.  When trying to walk, she reportedly fell.  She reportedly had not hit her head and had no loss of consciousness.  She is not on blood thinner.  She complained of bilateral ankle pain left greater than right.  She went to [facility] ED.  Initial recorded vital signs were temperature 97.1 °F, /64, heart rate 68, respiratory rate 18, O2 saturation 96% on room air.  Abnormal labs included BUN 31, creatinine 1.30, GFR 45, blood glucose 200, WBC 11.8, neutrophils 83%.  CT head without IV contrast showed no acute intracranial findings.  CT cervical spine without IV contrast showed multilevel degenerative spondylosis with no acute process abnormality.  Right knee 3 view x-ray showed moderate diffuse osteopenia, osteoarthrosis of knee with severe medial and moderate to  cyanosis, no edema, brisk 2+ DP pulses  Neuro/Psych: pleasant mood and affect, CN 2-12 grossly intact, sensory grossly within normal limit, Strength 5/5 in all extremities  Skin: warm     CHRONIC MEDICAL DIAGNOSES:  Principal Problem:    Fall (on)(from) incline, initial encounter  Active Problems:    Bimalleolar ankle fracture, left, closed, initial encounter  Resolved Problems:    * No resolved hospital problems. *        Greater than 31 minutes were spent with the patient on counseling and coordination of care    Signed:   WOODY Zamora NP  12/19/2024  11:27 AM

## 2024-12-19 NOTE — PROGRESS NOTES
SBAR report given to nurse at [facility]    Report and discharge folder given to transportation

## 2024-12-19 NOTE — DISCHARGE INSTRUCTIONS
Discharge Summary       PATIENT ID: Dominique Pelayo  MRN: [id number]   YOB: 1955    DATE OF ADMISSION: 12/16/2024  3:49 PM    DATE OF DISCHARGE: 12/19/24  PRIMARY CARE PROVIDER: Kathe Wyatt MD     ATTENDING PHYSICIAN: Laverne   DISCHARGING PROVIDER: WOODY Zamora NP    To contact this individual call [phone] and ask the  to page.  If unavailable ask to be transferred the Adult Hospitalist Department.    CONSULTATIONS: IP CONSULT TO ORTHOPEDIC SURGERY  IP CONSULT TO HOSPITALIST  IP CONSULT TO SPIRITUAL SERVICES  IP CONSULT TO ORTHOPEDIC SURGERY    PROCEDURES/SURGERIES: Procedure(s):  LEFT ANKLE OPEN REDUCTION INTERNAL FIXATION SMALL FRAG SET  SYNTHES AWARE  REQ 1600     ADMITTING DIAGNOSES & HOSPITAL COURSE:     Dominique Pelayo is a 69 y.o. female past medical history of left breast cancer, hypertension, type 2 diabetes mellitus, GERD presents as a direct admission/transfer from [facility] ED to [facility] with chief complaint of bilateral ankle pain after ground-level fall.  Today at about 1030 hrs., patient had a ground-level fall.  She reportedly felt dizzy and nauseated.  When trying to walk, she reportedly fell.  She reportedly had not hit her head and had no loss of consciousness.  She is not on blood thinner.  She complained of bilateral ankle pain left greater than right.  She went to [facility] ED.  Initial recorded vital signs were temperature 97.1 °F, /64, heart rate 68, respiratory rate 18, O2 saturation 96% on room air.  Abnormal labs included BUN 31, creatinine 1.30, GFR 45, blood glucose 200, WBC 11.8, neutrophils 83%.  CT head without IV contrast showed no acute intracranial findings.  CT cervical spine without IV contrast showed multilevel degenerative spondylosis with no acute process abnormality.  Right knee 3 view x-ray showed moderate diffuse osteopenia, osteoarthrosis of knee with severe medial and moderate to

## 2024-12-23 NOTE — PROGRESS NOTES
Physician Progress Note      PATIENT:               SHAQUILLE RICCI  [facility] #:                  [id number]  :                       1955  ADMIT DATE:       2024 3:49 PM  DISCH DATE:        2024 1:54 PM  RESPONDING  PROVIDER #:        Malik Chadwick DO          QUERY TEXT:    Pt admitted with Bimalleolar ankle fracture, left. Pt noted to have osteopenia   in XR right Knee . If possible, please document in progress notes and   discharge summary if you are evaluating and/or treating any of the following:    The medical record reflects the following:  Risk Factors: Osteopenia, fall, Age 69 female  Clinical Indicators: H&P  patient had a ground-level fall . She   complained of bilateral ankle pain left greater than right. Bimalleolar ankle   fracture, left.  XR knee right  Three views of the right knee demonstrate moderate diffuse   osteopenia.  Treatment: S/P ORIF , X-Ray, Orthopedic Surgery consult , vitamin D (VITAMIN   D3) 25 MCG (1000 UT) CAPS    Thank You  Mary Talavera S CDS  Options provided:  -- Pathological Bimalleolar ankle fracture, left. due to osteopenia following   fall which would not usually break a normal, healthy bone  -- Traumatic Bimalleolar ankle fracture, left.  -- Other - I will add my own diagnosis  -- Disagree - Not applicable / Not valid  -- Disagree - Clinically unable to determine / Unknown  -- Refer to Clinical Documentation Reviewer    PROVIDER RESPONSE TEXT:    This patient has a traumatic Bimalleolar ankle fracture, left.    Query created by: Mary Merritt on 2024 2:51 AM      Electronically signed by:  Malik Chadwick DO 2024 8:17 AM

## 2024-12-24 NOTE — PROGRESS NOTES
Physician Progress Note      PATIENT:               SHAQUILLE RICCI  [facility] #:                  [id number]  :                       1955  ADMIT DATE:       2024 3:49 PM  DISCH DATE:        2024 1:54 PM  RESPONDING  PROVIDER #:        Lanny Burt NP          QUERY TEXT:    Patient admitted with Bimalleolar ankle fracture,.  Noted documentation of   Acute Kidney Injury in H&P .  In order to support the diagnosis of CHENCHO,   please include additional clinical indicators in your documentation.? Or   please document if the diagnosis of CHENCHO has been ruled out after further   study.    The medical record reflects the following:  Risk Factors: HTN, DM  Clinical Indicators: H&P   CHENCHO -BUN 31, creatinine 1.30, GFR 45.  DS  CHENCHO (acute kidney injury), resolved    BUN  /  CR 1.30,1.08  GFR 45 ,56  Treatment: IVF, Monitor BMP, Avoid nephrotoxic agents    Thank You  Mary MADDOX CDS      Defined by Kidney Disease Improving Global Outcomes (KDIGO) clinical practice   guideline for acute kidney injury:  -Increase in SCr by greater than or equal to 0.3 mg/dl within 48 hours; or  -Increase or decrease in SCr to greater than or equal to 1.5 times baseline,   which is known or presumed to have occurred within the prior 7 days; or  -Urine volume < 0.5ml/kg/h for 6 hours.  Options provided:  -- Acute kidney injury evidenced by, Please document evidence as well as a   numerical baseline creatinine, if known.  -- Acute kidney injury ruled out after study  -- Other - I will add my own diagnosis  -- Disagree - Not applicable / Not valid  -- Disagree - Clinically unable to determine / Unknown  -- Refer to Clinical Documentation Reviewer    PROVIDER RESPONSE TEXT:    This patient has acute kidney injury as evidenced by -Increase in SCr by   greater than or equal to 0.3 mg/dl within 48 hours    Query created by: Mary Merritt on 2024 2:50 AM      Electronically signed by:

## 2025-01-20 PROBLEM — H16.223 KERATOCONJUNCTIVITIS SICCA, NOT SPECIFIED AS SJOGREN'S, BILATERAL: Status: ACTIVE | Noted: 2025-01-20

## (undated) DEVICE — BIT DRL L160MM DIA2.7MM CANN QUIK CPL ADJ STP REUSE FOR

## (undated) DEVICE — SUTURE VICRYL SZ 3-0 L18IN ABSRB UD PS-2 L19MM 3/8 CRV PRIM J497H

## (undated) DEVICE — 3M™ IOBAN™ 2 ANTIMICROBIAL INCISE DRAPE 6651EZ: Brand: IOBAN™ 2

## (undated) DEVICE — X-RAY DETECTABLE SPONGES,16 PLY: Brand: VISTEC

## (undated) DEVICE — COVER LT HNDL PLAS RIG 1 PER PK

## (undated) DEVICE — GLOVE SURG SZ 8 L12IN FNGR THK94MIL STD WHT LTX FREE

## (undated) DEVICE — GLOVE ORTHO 7 1/2   MSG9475

## (undated) DEVICE — BANDAGE COBAN 4 IN COMPR W4INXL5YD FOAM COHESIVE QUIK STK SELF ADH SFT

## (undated) DEVICE — PADDING CAST 4 INX5 YD STRL

## (undated) DEVICE — SUTURE VICRYL + SZ 2-0 L27IN ABSRB WHT SH 1/2 CIR TAPERCUT VCP417H

## (undated) DEVICE — SUTURE ABSORBABLE BRAIDED 2-0 CT-1 27 IN UD VICRYL J259H

## (undated) DEVICE — ASTOUND STANDARD SURGICAL GOWN, XXL: Brand: CONVERTORS

## (undated) DEVICE — BIT DRL L125MM DIA2MM QUIK CPL W/O STP REUSE

## (undated) DEVICE — BLADE,CARBON-STEEL,15,STRL,DISPOSABLE,TB: Brand: MEDLINE

## (undated) DEVICE — DRESSING PETRO W3XL3IN OIL EMUL N ADH GZ KNIT IMPREG CELOS

## (undated) DEVICE — ZIMMER® STERILE DISPOSABLE TOURNIQUET CUFF WITH PLC, DUAL PORT, SINGLE BLADDER, 34 IN. (86 CM)

## (undated) DEVICE — BIT DRL L125MM DIA2.7MM QUIK CPL 3 FLUT

## (undated) DEVICE — SPONGE LAP W18XL18IN WHT COT 4 PLY FLD STRUNG RADPQ DISP ST 2 PER PACK

## (undated) DEVICE — C-ARMOR C-ARM EQUIPMENT COVERS CLEAR STERILE UNIVERSAL FIT 12 PER CASE: Brand: C-ARMOR

## (undated) DEVICE — BANDAGE,ELASTIC,ESMARK,STERILE,4"X9',LF: Brand: MEDLINE

## (undated) DEVICE — BIT DRL L110MM DIA2.5MM G QUIK CPL W/O STP REUSE

## (undated) DEVICE — EXTREMITY - SMH: Brand: MEDLINE INDUSTRIES, INC.

## (undated) DEVICE — SUTURE NONABSORBABLE MONOFILAMENT 3-0 PS-1 18 IN BLK ETHILON 1663H